# Patient Record
Sex: FEMALE | Race: WHITE | Employment: UNEMPLOYED | URBAN - METROPOLITAN AREA
[De-identification: names, ages, dates, MRNs, and addresses within clinical notes are randomized per-mention and may not be internally consistent; named-entity substitution may affect disease eponyms.]

---

## 2018-01-18 ENCOUNTER — APPOINTMENT (OUTPATIENT)
Dept: CT IMAGING | Age: 43
End: 2018-01-18
Attending: EMERGENCY MEDICINE
Payer: MEDICAID

## 2018-01-18 ENCOUNTER — HOSPITAL ENCOUNTER (OUTPATIENT)
Age: 43
Setting detail: OBSERVATION
Discharge: HOME | End: 2018-01-19
Attending: EMERGENCY MEDICINE | Admitting: INTERNAL MEDICINE
Payer: MEDICAID

## 2018-01-18 DIAGNOSIS — S00.03XA SCALP HEMATOMA, INITIAL ENCOUNTER: ICD-10-CM

## 2018-01-18 DIAGNOSIS — R56.9 NEW ONSET SEIZURE (HCC): Primary | ICD-10-CM

## 2018-01-18 DIAGNOSIS — D64.9 CHRONIC ANEMIA: ICD-10-CM

## 2018-01-18 LAB
ALBUMIN SERPL-MCNC: 3.8 G/DL (ref 3.4–5)
ALBUMIN/GLOB SERPL: 0.8 {RATIO} (ref 0.8–1.7)
ALP SERPL-CCNC: 100 U/L (ref 45–117)
ALT SERPL-CCNC: 17 U/L (ref 13–56)
AMPHET UR QL SCN: POSITIVE
ANION GAP SERPL CALC-SCNC: 13 MMOL/L (ref 3–18)
APPEARANCE UR: ABNORMAL
AST SERPL-CCNC: 24 U/L (ref 15–37)
BACTERIA URNS QL MICRO: ABNORMAL /HPF
BARBITURATES UR QL SCN: NEGATIVE
BASOPHILS # BLD: 0 K/UL (ref 0–0.06)
BASOPHILS NFR BLD: 1 % (ref 0–2)
BENZODIAZ UR QL: POSITIVE
BILIRUB SERPL-MCNC: 0.2 MG/DL (ref 0.2–1)
BILIRUB UR QL: NEGATIVE
BUN SERPL-MCNC: 8 MG/DL (ref 7–18)
BUN/CREAT SERPL: 11 (ref 12–20)
CALCIUM SERPL-MCNC: 8.7 MG/DL (ref 8.5–10.1)
CANNABINOIDS UR QL SCN: NEGATIVE
CAOX CRY URNS QL MICRO: ABNORMAL
CHLORIDE SERPL-SCNC: 99 MMOL/L (ref 100–108)
CK MB CFR SERPL CALC: 2.1 % (ref 0–4)
CK MB SERPL-MCNC: 1.6 NG/ML (ref 5–25)
CK SERPL-CCNC: 75 U/L (ref 26–192)
CO2 SERPL-SCNC: 24 MMOL/L (ref 21–32)
COCAINE UR QL SCN: NEGATIVE
COLOR UR: YELLOW
CREAT SERPL-MCNC: 0.76 MG/DL (ref 0.6–1.3)
DIFFERENTIAL METHOD BLD: ABNORMAL
EOSINOPHIL # BLD: 0 K/UL (ref 0–0.4)
EOSINOPHIL NFR BLD: 1 % (ref 0–5)
EPITH CASTS URNS QL MICRO: ABNORMAL /LPF (ref 0–5)
ERYTHROCYTE [DISTWIDTH] IN BLOOD BY AUTOMATED COUNT: 17.9 % (ref 11.6–14.5)
ETHANOL SERPL-MCNC: <3 MG/DL (ref 0–3)
GLOBULIN SER CALC-MCNC: 4.5 G/DL (ref 2–4)
GLUCOSE BLD STRIP.AUTO-MCNC: 107 MG/DL (ref 70–110)
GLUCOSE SERPL-MCNC: 117 MG/DL (ref 74–99)
GLUCOSE UR STRIP.AUTO-MCNC: NEGATIVE MG/DL
HCG SERPL QL: NEGATIVE
HCT VFR BLD AUTO: 28.9 % (ref 35–45)
HDSCOM,HDSCOM: ABNORMAL
HGB BLD-MCNC: 8.8 G/DL (ref 12–16)
HGB UR QL STRIP: NEGATIVE
KETONES UR QL STRIP.AUTO: NEGATIVE MG/DL
LEUKOCYTE ESTERASE UR QL STRIP.AUTO: ABNORMAL
LYMPHOCYTES # BLD: 1.7 K/UL (ref 0.9–3.6)
LYMPHOCYTES NFR BLD: 26 % (ref 21–52)
MCH RBC QN AUTO: 19.2 PG (ref 24–34)
MCHC RBC AUTO-ENTMCNC: 30.4 G/DL (ref 31–37)
MCV RBC AUTO: 63 FL (ref 74–97)
METHADONE UR QL: NEGATIVE
MONOCYTES # BLD: 0.3 K/UL (ref 0.05–1.2)
MONOCYTES NFR BLD: 5 % (ref 3–10)
NEUTS SEG # BLD: 4.6 K/UL (ref 1.8–8)
NEUTS SEG NFR BLD: 67 % (ref 40–73)
NITRITE UR QL STRIP.AUTO: NEGATIVE
OPIATES UR QL: NEGATIVE
PCP UR QL: NEGATIVE
PH UR STRIP: 6.5 [PH] (ref 5–8)
PHOSPHATE SERPL-MCNC: 2.3 MG/DL (ref 2.5–4.9)
PLATELET # BLD AUTO: 416 K/UL (ref 135–420)
POTASSIUM SERPL-SCNC: 4 MMOL/L (ref 3.5–5.5)
PROT SERPL-MCNC: 8.3 G/DL (ref 6.4–8.2)
PROT UR STRIP-MCNC: NEGATIVE MG/DL
RBC # BLD AUTO: 4.59 M/UL (ref 4.2–5.3)
RBC #/AREA URNS HPF: ABNORMAL /HPF (ref 0–5)
SODIUM SERPL-SCNC: 136 MMOL/L (ref 136–145)
SP GR UR REFRACTOMETRY: 1.01 (ref 1–1.03)
TROPONIN I SERPL-MCNC: 0.02 NG/ML (ref 0–0.04)
UROBILINOGEN UR QL STRIP.AUTO: 0.2 EU/DL (ref 0.2–1)
WBC # BLD AUTO: 6.7 K/UL (ref 4.6–13.2)
WBC URNS QL MICRO: ABNORMAL /HPF (ref 0–4)

## 2018-01-18 PROCEDURE — 74011250636 HC RX REV CODE- 250/636: Performed by: FAMILY MEDICINE

## 2018-01-18 PROCEDURE — 80053 COMPREHEN METABOLIC PANEL: CPT | Performed by: EMERGENCY MEDICINE

## 2018-01-18 PROCEDURE — 70450 CT HEAD/BRAIN W/O DYE: CPT

## 2018-01-18 PROCEDURE — 96368 THER/DIAG CONCURRENT INF: CPT

## 2018-01-18 PROCEDURE — 96361 HYDRATE IV INFUSION ADD-ON: CPT

## 2018-01-18 PROCEDURE — 80307 DRUG TEST PRSMV CHEM ANLYZR: CPT | Performed by: EMERGENCY MEDICINE

## 2018-01-18 PROCEDURE — 85025 COMPLETE CBC W/AUTO DIFF WBC: CPT | Performed by: EMERGENCY MEDICINE

## 2018-01-18 PROCEDURE — 99285 EMERGENCY DEPT VISIT HI MDM: CPT

## 2018-01-18 PROCEDURE — 74011000250 HC RX REV CODE- 250: Performed by: EMERGENCY MEDICINE

## 2018-01-18 PROCEDURE — 77030020263 HC SOL INJ SOD CL0.9% LFCR 1000ML

## 2018-01-18 PROCEDURE — 93005 ELECTROCARDIOGRAM TRACING: CPT

## 2018-01-18 PROCEDURE — 83540 ASSAY OF IRON: CPT | Performed by: FAMILY MEDICINE

## 2018-01-18 PROCEDURE — 82962 GLUCOSE BLOOD TEST: CPT

## 2018-01-18 PROCEDURE — 99218 HC RM OBSERVATION: CPT

## 2018-01-18 PROCEDURE — 74011250636 HC RX REV CODE- 250/636

## 2018-01-18 PROCEDURE — 82550 ASSAY OF CK (CPK): CPT | Performed by: EMERGENCY MEDICINE

## 2018-01-18 PROCEDURE — 84100 ASSAY OF PHOSPHORUS: CPT | Performed by: FAMILY MEDICINE

## 2018-01-18 PROCEDURE — 81001 URINALYSIS AUTO W/SCOPE: CPT | Performed by: EMERGENCY MEDICINE

## 2018-01-18 PROCEDURE — 82728 ASSAY OF FERRITIN: CPT | Performed by: FAMILY MEDICINE

## 2018-01-18 PROCEDURE — 96365 THER/PROPH/DIAG IV INF INIT: CPT

## 2018-01-18 PROCEDURE — 95816 EEG AWAKE AND DROWSY: CPT | Performed by: FAMILY MEDICINE

## 2018-01-18 PROCEDURE — 65270000029 HC RM PRIVATE

## 2018-01-18 PROCEDURE — 74011250636 HC RX REV CODE- 250/636: Performed by: EMERGENCY MEDICINE

## 2018-01-18 PROCEDURE — 84703 CHORIONIC GONADOTROPIN ASSAY: CPT | Performed by: EMERGENCY MEDICINE

## 2018-01-18 RX ORDER — LORAZEPAM 2 MG/ML
2 INJECTION INTRAMUSCULAR
Status: DISCONTINUED | OUTPATIENT
Start: 2018-01-18 | End: 2018-01-19 | Stop reason: HOSPADM

## 2018-01-18 RX ORDER — LEVETIRACETAM 15 MG/ML
1500 INJECTION INTRAVASCULAR
Status: DISPENSED | OUTPATIENT
Start: 2018-01-18 | End: 2018-01-19

## 2018-01-18 RX ORDER — SODIUM CHLORIDE 0.9 % (FLUSH) 0.9 %
5-10 SYRINGE (ML) INJECTION AS NEEDED
Status: DISCONTINUED | OUTPATIENT
Start: 2018-01-18 | End: 2018-01-19 | Stop reason: HOSPADM

## 2018-01-18 RX ORDER — LEVETIRACETAM 5 MG/ML
INJECTION INTRAVASCULAR
Status: COMPLETED
Start: 2018-01-18 | End: 2018-01-18

## 2018-01-18 RX ORDER — LIDOCAINE HYDROCHLORIDE 20 MG/ML
10 SOLUTION OROPHARYNGEAL
Status: COMPLETED | OUTPATIENT
Start: 2018-01-18 | End: 2018-01-18

## 2018-01-18 RX ORDER — SODIUM CHLORIDE 0.9 % (FLUSH) 0.9 %
5-10 SYRINGE (ML) INJECTION EVERY 8 HOURS
Status: DISCONTINUED | OUTPATIENT
Start: 2018-01-19 | End: 2018-01-19 | Stop reason: HOSPADM

## 2018-01-18 RX ORDER — SODIUM CHLORIDE 9 MG/ML
100 INJECTION, SOLUTION INTRAVENOUS CONTINUOUS
Status: DISCONTINUED | OUTPATIENT
Start: 2018-01-19 | End: 2018-01-19 | Stop reason: HOSPADM

## 2018-01-18 RX ORDER — ACETAMINOPHEN 325 MG/1
650 TABLET ORAL
Status: DISCONTINUED | OUTPATIENT
Start: 2018-01-18 | End: 2018-01-19 | Stop reason: HOSPADM

## 2018-01-18 RX ORDER — LEVETIRACETAM 10 MG/ML
INJECTION INTRAVASCULAR
Status: COMPLETED
Start: 2018-01-18 | End: 2018-01-18

## 2018-01-18 RX ADMIN — Medication 10 ML: at 23:50

## 2018-01-18 RX ADMIN — SODIUM CHLORIDE 100 ML/HR: 900 INJECTION, SOLUTION INTRAVENOUS at 23:51

## 2018-01-18 RX ADMIN — LEVETIRACETAM 1000 MG: 10 INJECTION INTRAVENOUS at 20:43

## 2018-01-18 RX ADMIN — LIDOCAINE HYDROCHLORIDE 10 ML: 20 SOLUTION ORAL; TOPICAL at 22:05

## 2018-01-18 RX ADMIN — LEVETIRACETAM 500 MG: 5 INJECTION INTRAVENOUS at 20:43

## 2018-01-18 RX ADMIN — SODIUM CHLORIDE 1000 ML: 900 INJECTION, SOLUTION INTRAVENOUS at 20:43

## 2018-01-18 NOTE — IP AVS SNAPSHOT
Sunita Pulse 
 
 
 [address] [place] [address] 
[phone] Patient: Griselda Castor MRN: [id number] QA:0/9/7592 About your hospitalization You were admitted on:  January 18, 2018 You last received care in the:  [address] NEURO [facility] You were discharged on:  January 19, 2018 Why you were hospitalized Your primary diagnosis was:  Seizure (Hcc) Your diagnoses also included:  Hematoma Of Frontal Scalp, Microcytic Anemia Follow-up Information Follow up With Details Comments Contact Info MD Duyen Diaz MD Schedule an appointment as soon as possible for a visit neurology [address] [place] [phone] Discharge Orders None A check renard indicates which time of day the medication should be taken. My Medications CONTINUE taking these medications Instructions Each Dose to Equal  
 Morning Noon Evening Bedtime ADDERALL 30 mg tablet Generic drug:  dextroamphetamine-amphetamine Your last dose was: Your next dose is: Take 30 mg by mouth two (2) times a day. Indications: ATTENTION-DEFICIT HYPERACTIVITY DISORDER  
 30 mg  
    
   
   
   
  
 DAILY MULTI-VITAMINS/IRON tablet Generic drug:  multivitamin with iron Your last dose was: Your next dose is: Take 1 Tab by mouth daily. 1 Tab  
    
   
   
   
  
 docusate sodium 100 mg capsule Commonly known as:  Olene Cam Your last dose was: Your next dose is: Take 1 Cap by mouth two (2) times a day. Indications: Stool Softner 100 mg  
    
   
   
   
  
 omeprazole 20 mg capsule Commonly known as:  PriLOSEC Your last dose was: Your next dose is: Take 1 Cap by mouth daily. 20 mg  
    
   
   
   
  
 ondansetron 4 mg disintegrating tablet Commonly known as:  ZOFRAN ODT  
   
 Your last dose was: Your next dose is: Take 1 Tab by mouth every six (6) hours as needed for Nausea. 4 mg  
    
   
   
   
  
 polyethylene glycol 17 gram/dose powder Commonly known as:  Sarah Anatoliy Your last dose was: Your next dose is: Take 17 g by mouth daily. 1 tablespoon with 8 oz of water daily  Indications: CONSTIPATION  
 17 g Discharge Instructions STOP TAKING XANAX 
  
 
DISCHARGE SUMMARY from Nurse PATIENT INSTRUCTIONS: 
 
 
F-face looks uneven A-arms unable to move or move unevenly S-speech slurred or non-existent T-time-call 911 as soon as signs and symptoms begin-DO NOT go Back to bed or wait to see if you get better-TIME IS BRAIN. Warning Signs of HEART ATTACK Call 911 if you have these symptoms: 
? Chest discomfort. Most heart attacks involve discomfort in the center of the chest that lasts more than a few minutes, or that goes away and comes back. It can feel like uncomfortable pressure, squeezing, fullness, or pain. ? Discomfort in other areas of the upper body. Symptoms can include pain or discomfort in one or both arms, the back, neck, jaw, or stomach. ? Shortness of breath with or without chest discomfort. ? Other signs may include breaking out in a cold sweat, nausea, or lightheadedness. Don't wait more than five minutes to call [address]! Fast action can save your life. Calling 911 is almost always the fastest way to get lifesaving treatment.  Emergency Medical Services staff can begin treatment when they arrive  up to an hour sooner than if someone gets to the hospital by car. The discharge information has been reviewed with the patient. The patient verbalized understanding. Discharge medications reviewed with the patient and appropriate educational materials and side effects teaching were provided. ___________________________________________________________________________________________________________________________________ Head Injury: Care Instructions Your Care Instructions Most injuries to the head are minor. Bumps, cuts, and scrapes on the head and face usually heal well and can be treated the same as injuries to other parts of the body. Although it's rare, once in a while a more serious problem shows up after you are home. So it's good to be on the lookout for symptoms for a day or two. Follow-up care is a key part of your treatment and safety. Be sure to make and go to all appointments, and call your doctor if you are having problems. It's also a good idea to know your test results and keep a list of the medicines you take. How can you care for yourself at home? · Follow your doctor's instructions. He or she will tell you if you need someone to watch you closely for the next 24 hours or longer. · Take it easy for the next few days or more if you are not feeling well. · Ask your doctor when it's okay for you to go back to activities like driving a car, riding a bike, or operating machinery. When should you call for help? Call 911 anytime you think you may need emergency care. For example, call if: 
? · You have a seizure. ? · You passed out (lost consciousness). ? · You are confused or can't stay awake. ?Call your doctor now or seek immediate medical care if: 
? · You have new or worse vomiting. ? · You feel less alert. ? · You have new weakness or numbness in any part of your body. ? Watch closely for changes in your health, and be sure to contact your doctor if: 
? · You do not get better as expected. ? · You have new symptoms, such as headaches, trouble concentrating, or changes in mood. Where can you learn more? Go to http://kiara-val.info/. Enter Q304 in the search box to learn more about \"Head Injury: Care Instructions. \" Current as of: October 14, 2016 Content Version: 11.4 © 0003-8188 Collactive. Care instructions adapted under license by Gigantt (which disclaims liability or warranty for this information). If you have questions about a medical condition or this instruction, always ask your healthcare professional. [place] [address] any warranty or liability for your use of this information. Seizure: Care Instructions Your Care Instructions Seizures are caused by abnormal patterns of electrical signals in the brain. They are different for each person. Seizures can affect movement, speech, vision, or awareness. Some people have only slight shaking of a hand and do not pass out. Other people may pass out and have violent shaking of the whole body. Some people appear to stare into space. They are awake, but they can't respond normally. Later, they may not remember what happened. You may need tests to identify the type and cause of the seizures. A seizure may occur only once, or you may have them more than one time. Taking medicines as directed and following up with your doctor may help keep you from having more seizures. The doctor has checked you carefully, but problems can develop later. If you notice any problems or new symptoms, get medical treatment right away. Follow-up care is a key part of your treatment and safety. Be sure to make and go to all appointments, and call your doctor if you are having problems. It's also a good idea to know your test results and keep a list of the medicines you take. How can you care for yourself at home? · Be safe with medicines. Take your medicines exactly as prescribed. Call your doctor if you think you are having a problem with your medicine. · Do not do any activity that could be dangerous to you or others until your doctor says it is safe to do so. For example, do not drive a car, operate machinery, swim, or climb ladders. · Be sure that anyone treating you for any health problem knows that you have had a seizure and what medicines you are taking for it. · Identify and avoid things that may make you more likely to have a seizure. These may include lack of sleep, alcohol or drug use, stress, or not eating. · Make sure you go to your follow-up appointment. When should you call for help? Call 911 anytime you think you may need emergency care. For example, call if: 
? · You have another seizure. ? · You have more than one seizure in 24 hours. ? · You have new symptoms, such as trouble walking, speaking, or thinking clearly. ?Call your doctor now or seek immediate medical care if: 
? · You are not acting normally. ? Watch closely for changes in your health, and be sure to contact your doctor if you have any problems. Where can you learn more? Go to http://kiara-val.info/. Enter D332 in the search box to learn more about \"Seizure: Care Instructions. \" Current as of: October 14, 2016 Content Version: 11.4 © 7928-9805 BioAtlantis. Care instructions adapted under license by Sonian (which disclaims liability or warranty for this information). If you have questions about a medical condition or this instruction, always ask your healthcare professional. [place] [address] any warranty or liability for your use of this information. Anemia: Care Instructions Your Care Instructions Anemia is a low level of red blood cells, which carry oxygen throughout your body. Many things can cause anemia.  Lack of iron is one of the most common causes. Your body needs iron to make hemoglobin, a substance in red blood cells that carries oxygen from the lungs to your body's cells. Without enough iron, the body produces fewer and smaller red blood cells. As a result, your body's cells do not get enough oxygen, and you feel tired and weak. And you may have trouble concentrating. Bleeding is the most common cause of a lack of iron. You may have heavy menstrual bleeding or bleeding caused by conditions such as ulcers, hemorrhoids, or cancer. Regular use of aspirin or other anti-inflammatory medicines (such as ibuprofen) also can cause bleeding in some people. A lack of iron in your diet also can cause anemia, especially at times when the body needs more iron, such as during pregnancy, infancy, and the teen years. Your doctor may have prescribed iron pills. It may take several months of treatment for your iron levels to return to normal. Your doctor also may suggest that you eat foods that are rich in iron, such as meat and beans. There are many other causes of anemia. It is not always due to a lack of iron. Finding the specific cause of your anemia will help your doctor find the right treatment for you. Follow-up care is a key part of your treatment and safety. Be sure to make and go to all appointments, and call your doctor if you are having problems. It's also a good idea to know your test results and keep a list of the medicines you take. How can you care for yourself at home? · Take your medicines exactly as prescribed. Call your doctor if you think you are having a problem with your medicine. · If your doctor recommends iron pills, take them as directed: ¨ Try to take the pills on an empty stomach about 1 hour before or 2 hours after meals. But you may need to take iron with food to avoid an upset stomach.  
¨ Do not take antacids or drink milk or caffeine drinks (such as coffee, tea, or cola) at the same time or within 2 hours of the time that you take your iron. They can make it hard for your body to absorb the iron. ¨ Vitamin C (from food or supplements) helps your body absorb iron. Try taking iron pills with a glass of orange juice or some other food that is high in vitamin C, such as citrus fruits. ¨ Iron pills may cause stomach problems, such as heartburn, nausea, diarrhea, constipation, and cramps. Be sure to drink plenty of fluids, and include fruits, vegetables, and fiber in your diet each day. Iron pills often make your bowel movements dark or green. ¨ If you forget to take an iron pill, do not take a double dose of iron the next time you take a pill. ¨ Keep iron pills out of the reach of small children. An overdose of iron can be very dangerous. · Follow your doctor's advice about eating iron-rich foods. These include red meat, shellfish, poultry, eggs, beans, raisins, whole-grain bread, and leafy green vegetables. · Steam vegetables to help them keep their iron content. When should you call for help? Call 911 anytime you think you may need emergency care. For example, call if: 
? · You have symptoms of a heart attack. These may include: ¨ Chest pain or pressure, or a strange feeling in the chest. 
¨ Sweating. ¨ Shortness of breath. ¨ Nausea or vomiting. ¨ Pain, pressure, or a strange feeling in the back, neck, jaw, or upper belly or in one or both shoulders or arms. ¨ Lightheadedness or sudden weakness. ¨ A fast or irregular heartbeat. After you call 911, the  may tell you to chew 1 adult-strength or 2 to 4 low-dose aspirin. Wait for an ambulance. Do not try to drive yourself. ? · You passed out (lost consciousness). ?Call your doctor now or seek immediate medical care if: 
? · You have new or increased shortness of breath. ? · You are dizzy or lightheaded, or you feel like you may faint. ? · Your fatigue and weakness continue or get worse. ? · You have any abnormal bleeding, such as: 
¨ Nosebleeds. ¨ Vaginal bleeding that is different (heavier, more frequent, at a different time of the month) than what you are used to. ¨ Bloody or black stools, or rectal bleeding. ¨ Bloody or pink urine. ? Watch closely for changes in your health, and be sure to contact your doctor if: 
? · You do not get better as expected. Where can you learn more? Go to http://kiara-val.info/. Enter R301 in the search box to learn more about \"Anemia: Care Instructions. \" Current as of: October 13, 2016 Content Version: 11.4 © 1158-9511 Proteus Industries. Care instructions adapted under license by Railpod (which disclaims liability or warranty for this information). If you have questions about a medical condition or this instruction, always ask your healthcare professional. [place] [address] any warranty or liability for your use of this information. Hematoma: Care Instructions Your Care Instructions A hematoma is a bad bruise. It happens when an injury causes blood to collect and pool under the skin. The pooling blood gives the skin a spongy, rubbery, lumpy feel. A hematoma usually is not a cause for concern. It is not the same thing as a blood clot in a vein, and it does not cause blood clots. Follow-up care is a key part of your treatment and safety. Be sure to make and go to all appointments, and call your doctor if you are having problems. It's also a good idea to know your test results and keep a list of the medicines you take. How can you care for yourself at home? · Rest and protect the bruised area. · Put ice or a cold pack on the area for 10 to 20 minutes at a time. · Prop up the bruised area on a pillow when you ice it or anytime you sit or lie down during the next 3 days. Try to keep it above the level of your heart. This will help reduce swelling. · Wrapping the bruised area with an elastic bandage such as an Ace wrap will help decrease swelling. Don't wrap it too tightly, as this can cause more swelling below the affected area. · Take an over-the-counter pain medicine, such as acetaminophen (Tylenol), ibuprofen (Advil, Motrin), or naproxen (Aleve). · Do not take two or more pain medicines at the same time unless the doctor told you to. Many pain medicines have acetaminophen, which is Tylenol. Too much acetaminophen (Tylenol) can be harmful. When should you call for help? Call your doctor now or seek immediate medical care if: 
? · You have signs of skin infection, such as: 
¨ Increased pain, swelling, warmth, or redness. ¨ Red streaks leading from the area. ¨ Pus draining from the area. ¨ A fever. ? Watch closely for changes in your health, and be sure to contact your doctor if: 
? · The bruise lasts longer than 4 weeks. ? · The bruise gets bigger or becomes more painful. ? · You do not get better as expected. Where can you learn more? Go to http://kiara-val.info/. Enter P911 in the search box to learn more about \"Hematoma: Care Instructions. \" Current as of: March 20, 2017 Content Version: 11.4 © 3762-7829 Tabtor. Care instructions adapted under license by Woven Systems (which disclaims liability or warranty for this information). If you have questions about a medical condition or this instruction, always ask your healthcare professional. [place] [address] any warranty or liability for your use of this information. Patient armband removed and shredded. Standard [employer] Index Announcement We are excited to announce that we are making your provider's discharge notes available to you in Workdayhart.   You will see these notes when they are completed and signed by the physician that discharged you from your recent hospital stay. If you have any questions or concerns about any information you see in VisibleGains, please call the Health Information Department where you were seen or reach out to your Primary Care Provider for more information about your plan of care. Introducing hospitals & HEALTH SERVICES! [facility] introduces VisibleGains patient portal. Now you can access parts of your medical record, email your doctor's office, and request medication refills online. 1. In your internet browser, go to [email or web address] 2. Click on the First Time User? Click Here link in the Sign In box. You will see the New Member Sign Up page. 3. Enter your VisibleGains Access Code exactly as it appears below. You will not need to use this code after youve completed the sign-up process. If you do not sign up before the expiration date, you must request a new code. · VisibleGains Access Code: [email or web address] Expires: 4/19/2018  7:54 PM 
 
4. Enter the last four digits of your Social Security Number (xxxx) and Date of Birth (mm/dd/yyyy) as indicated and click Submit. You will be taken to the next sign-up page. 5. Create a VisibleGains ID. This will be your VisibleGains login ID and cannot be changed, so think of one that is secure and easy to remember. 6. Create a VisibleGains password. You can change your password at any time. 7. Enter your Password Reset Question and Answer. This can be used at a later time if you forget your password. 8. Enter your e-mail address. You will receive e-mail notification when new information is available in [address] 9. Click Sign Up. You can now view and download portions of your medical record. 10. Click the Download Summary menu link to download a portable copy of your medical information. If you have questions, please visit the Frequently Asked Questions section of the VisibleGains website. Remember, VisibleGains is NOT to be used for urgent needs. For medical emergencies, dial 911. Now available from your iPhone and Android! Providers Seen During Your Hospitalization Provider Specialty Primary office phone Lucia Horne MD Emergency Medicine [phone] Bard Jose MD Family Practice [phone] Stefan Gordones, [address] [place] [address] Internal Medicine [phone] Your Primary Care Physician (PCP) Primary Care Physician Office Phone Office Fax MINDY VEGAS ** None ** ** None ** You are allergic to the following No active allergies Recent Documentation Height Weight Breastfeeding? BMI Smoking Status 1.676 m 84.2 kg No 29.97 kg/m2 Never Smoker Emergency Contacts Name Discharge Info Relation Home Work Mobile [address] [facility] CAREGIVER [3] Spouse [3] 22 [phone] Patient Belongings The following personal items are in your possession at time of discharge: 
  Dental Appliances: None  Visual Aid: None      Home Medications: None   Jewelry: None  Clothing: Bathrobe, Footwear, Pants, Shirt, Sweater, Undergarments    Other Valuables: Cell Phone  Personal Items Sent to Safe:  (none) Please provide this summary of care documentation to your next provider. Signatures-by signing, you are acknowledging that this After Visit Summary has been reviewed with you and you have received a copy. Patient Signature:  ____________________________________________________________ Date:  ____________________________________________________________  
  
[facility] Provider Signature:  ____________________________________________________________ Date:  ____________________________________________________________

## 2018-01-18 NOTE — IP AVS SNAPSHOT
[address] [place] [address] 
[phone] Patient: Laury Dockery MRN: [id number] AAF:8/0/1825 A check renard indicates which time of day the medication should be taken. My Medications CONTINUE taking these medications Instructions Each Dose to Equal  
 Morning Noon Evening Bedtime ADDERALL 30 mg tablet Generic drug:  dextroamphetamine-amphetamine Your last dose was: Your next dose is: Take 30 mg by mouth two (2) times a day. Indications: ATTENTION-DEFICIT HYPERACTIVITY DISORDER  
 30 mg  
    
   
   
   
  
 DAILY MULTI-VITAMINS/IRON tablet Generic drug:  multivitamin with iron Your last dose was: Your next dose is: Take 1 Tab by mouth daily. 1 Tab  
    
   
   
   
  
 docusate sodium 100 mg capsule Commonly known as:  Thedore Dial Your last dose was: Your next dose is: Take 1 Cap by mouth two (2) times a day. Indications: Stool Softner 100 mg  
    
   
   
   
  
 omeprazole 20 mg capsule Commonly known as:  PriLOSEC Your last dose was: Your next dose is: Take 1 Cap by mouth daily. 20 mg  
    
   
   
   
  
 ondansetron 4 mg disintegrating tablet Commonly known as:  ZOFRAN ODT Your last dose was: Your next dose is: Take 1 Tab by mouth every six (6) hours as needed for Nausea. 4 mg  
    
   
   
   
  
 polyethylene glycol 17 gram/dose powder Commonly known as:  Nika Mayes Your last dose was: Your next dose is: Take 17 g by mouth daily. 1 tablespoon with 8 oz of water daily  Indications: CONSTIPATION  
 17 g

## 2018-01-18 NOTE — Clinical Note
Status[de-identified] Inpatient [101] Type of Bed: Remote Telemetry [29] Inpatient Hospitalization Certified Necessary for the Following Reasons: 3. Patient receiving treatment that can only be provided in an inpatient setting (further clarification in H&P documentation) Admitting Diagnosis: Seizure ([facility] [address] [[id number]] Admitting Physician: Memo Burk Attending Physician: Memo Burk Estimated Length of Stay: 2 Midnights Discharge Plan[de-identified] Home with Office Follow-up

## 2018-01-19 ENCOUNTER — APPOINTMENT (OUTPATIENT)
Dept: MRI IMAGING | Age: 43
End: 2018-01-19
Attending: NURSE PRACTITIONER
Payer: MEDICAID

## 2018-01-19 VITALS
OXYGEN SATURATION: 99 % | DIASTOLIC BLOOD PRESSURE: 92 MMHG | HEIGHT: 66 IN | RESPIRATION RATE: 18 BRPM | TEMPERATURE: 98 F | HEART RATE: 71 BPM | BODY MASS INDEX: 29.84 KG/M2 | SYSTOLIC BLOOD PRESSURE: 157 MMHG | WEIGHT: 185.7 LBS

## 2018-01-19 LAB
ALBUMIN SERPL-MCNC: 3 G/DL (ref 3.4–5)
ALBUMIN/GLOB SERPL: 0.9 {RATIO} (ref 0.8–1.7)
ALP SERPL-CCNC: 76 U/L (ref 45–117)
ALT SERPL-CCNC: 11 U/L (ref 13–56)
ANION GAP SERPL CALC-SCNC: 9 MMOL/L (ref 3–18)
AST SERPL-CCNC: 10 U/L (ref 15–37)
ATRIAL RATE: 110 BPM
BASOPHILS # BLD: 0 K/UL (ref 0–0.06)
BASOPHILS NFR BLD: 0 % (ref 0–2)
BILIRUB SERPL-MCNC: 0.2 MG/DL (ref 0.2–1)
BUN SERPL-MCNC: 7 MG/DL (ref 7–18)
BUN/CREAT SERPL: 12 (ref 12–20)
CALCIUM SERPL-MCNC: 7.9 MG/DL (ref 8.5–10.1)
CALCULATED P AXIS, ECG09: 69 DEGREES
CALCULATED R AXIS, ECG10: -34 DEGREES
CALCULATED T AXIS, ECG11: 29 DEGREES
CHLORIDE SERPL-SCNC: 105 MMOL/L (ref 100–108)
CO2 SERPL-SCNC: 25 MMOL/L (ref 21–32)
CREAT SERPL-MCNC: 0.57 MG/DL (ref 0.6–1.3)
DIAGNOSIS, 93000: NORMAL
DIFFERENTIAL METHOD BLD: ABNORMAL
EOSINOPHIL # BLD: 0 K/UL (ref 0–0.4)
EOSINOPHIL NFR BLD: 1 % (ref 0–5)
ERYTHROCYTE [DISTWIDTH] IN BLOOD BY AUTOMATED COUNT: 17.7 % (ref 11.6–14.5)
ERYTHROCYTE [DISTWIDTH] IN BLOOD BY AUTOMATED COUNT: 17.8 % (ref 11.6–14.5)
FERRITIN SERPL-MCNC: 3 NG/ML (ref 8–388)
GLOBULIN SER CALC-MCNC: 3.3 G/DL (ref 2–4)
GLUCOSE BLD STRIP.AUTO-MCNC: 91 MG/DL (ref 70–110)
GLUCOSE SERPL-MCNC: 101 MG/DL (ref 74–99)
HCT VFR BLD AUTO: 22.6 % (ref 35–45)
HCT VFR BLD AUTO: 24.1 % (ref 35–45)
HGB BLD-MCNC: 6.8 G/DL (ref 12–16)
HGB BLD-MCNC: 7.3 G/DL (ref 12–16)
IRON SATN MFR SERPL: 4 %
IRON SERPL-MCNC: 20 UG/DL (ref 50–175)
LYMPHOCYTES # BLD: 1.7 K/UL (ref 0.9–3.6)
LYMPHOCYTES NFR BLD: 32 % (ref 21–52)
MCH RBC QN AUTO: 19 PG (ref 24–34)
MCH RBC QN AUTO: 19 PG (ref 24–34)
MCHC RBC AUTO-ENTMCNC: 30.1 G/DL (ref 31–37)
MCHC RBC AUTO-ENTMCNC: 30.3 G/DL (ref 31–37)
MCV RBC AUTO: 62.8 FL (ref 74–97)
MCV RBC AUTO: 63.3 FL (ref 74–97)
MONOCYTES # BLD: 0.4 K/UL (ref 0.05–1.2)
MONOCYTES NFR BLD: 8 % (ref 3–10)
NEUTS SEG # BLD: 3.1 K/UL (ref 1.8–8)
NEUTS SEG NFR BLD: 59 % (ref 40–73)
P-R INTERVAL, ECG05: 172 MS
PLATELET # BLD AUTO: 389 K/UL (ref 135–420)
PLATELET # BLD AUTO: 425 K/UL (ref 135–420)
PMV BLD AUTO: 9.2 FL (ref 9.2–11.8)
PMV BLD AUTO: 9.4 FL (ref 9.2–11.8)
POTASSIUM SERPL-SCNC: 3.7 MMOL/L (ref 3.5–5.5)
PROT SERPL-MCNC: 6.3 G/DL (ref 6.4–8.2)
Q-T INTERVAL, ECG07: 362 MS
QRS DURATION, ECG06: 142 MS
QTC CALCULATION (BEZET), ECG08: 489 MS
RBC # BLD AUTO: 3.57 M/UL (ref 4.2–5.3)
RBC # BLD AUTO: 3.84 M/UL (ref 4.2–5.3)
SODIUM SERPL-SCNC: 139 MMOL/L (ref 136–145)
TIBC SERPL-MCNC: 536 UG/DL (ref 250–450)
VENTRICULAR RATE, ECG03: 110 BPM
WBC # BLD AUTO: 4 K/UL (ref 4.6–13.2)
WBC # BLD AUTO: 5.3 K/UL (ref 4.6–13.2)

## 2018-01-19 PROCEDURE — 74011250636 HC RX REV CODE- 250/636: Performed by: FAMILY MEDICINE

## 2018-01-19 PROCEDURE — A9577 INJ MULTIHANCE: HCPCS | Performed by: INTERNAL MEDICINE

## 2018-01-19 PROCEDURE — 77030032490 HC SLV COMPR SCD KNE COVD -B

## 2018-01-19 PROCEDURE — 82962 GLUCOSE BLOOD TEST: CPT

## 2018-01-19 PROCEDURE — 74011250637 HC RX REV CODE- 250/637: Performed by: INTERNAL MEDICINE

## 2018-01-19 PROCEDURE — 99218 HC RM OBSERVATION: CPT

## 2018-01-19 PROCEDURE — 80053 COMPREHEN METABOLIC PANEL: CPT | Performed by: FAMILY MEDICINE

## 2018-01-19 PROCEDURE — 96375 TX/PRO/DX INJ NEW DRUG ADDON: CPT

## 2018-01-19 PROCEDURE — 77030020263 HC SOL INJ SOD CL0.9% LFCR 1000ML

## 2018-01-19 PROCEDURE — 85027 COMPLETE CBC AUTOMATED: CPT | Performed by: INTERNAL MEDICINE

## 2018-01-19 PROCEDURE — 85025 COMPLETE CBC W/AUTO DIFF WBC: CPT | Performed by: FAMILY MEDICINE

## 2018-01-19 PROCEDURE — 70553 MRI BRAIN STEM W/O & W/DYE: CPT

## 2018-01-19 PROCEDURE — 74011250636 HC RX REV CODE- 250/636: Performed by: INTERNAL MEDICINE

## 2018-01-19 PROCEDURE — 36415 COLL VENOUS BLD VENIPUNCTURE: CPT | Performed by: FAMILY MEDICINE

## 2018-01-19 PROCEDURE — 74011250637 HC RX REV CODE- 250/637: Performed by: FAMILY MEDICINE

## 2018-01-19 RX ORDER — IBUPROFEN 400 MG/1
400 TABLET ORAL ONCE
Status: COMPLETED | OUTPATIENT
Start: 2018-01-19 | End: 2018-01-19

## 2018-01-19 RX ORDER — HYDROCODONE BITARTRATE AND ACETAMINOPHEN 5; 325 MG/1; MG/1
1 TABLET ORAL
Status: DISCONTINUED | OUTPATIENT
Start: 2018-01-19 | End: 2018-01-19 | Stop reason: HOSPADM

## 2018-01-19 RX ADMIN — IBUPROFEN 400 MG: 400 TABLET, FILM COATED ORAL at 05:06

## 2018-01-19 RX ADMIN — HYDROCODONE BITARTRATE AND ACETAMINOPHEN 1 TABLET: 5; 325 TABLET ORAL at 15:03

## 2018-01-19 RX ADMIN — Medication 10 ML: at 09:41

## 2018-01-19 RX ADMIN — ACETAMINOPHEN 650 MG: 325 TABLET, FILM COATED ORAL at 00:42

## 2018-01-19 RX ADMIN — GADOBENATE DIMEGLUMINE 15 ML: 529 INJECTION, SOLUTION INTRAVENOUS at 18:15

## 2018-01-19 RX ADMIN — LORAZEPAM 2 MG: 2 INJECTION, SOLUTION INTRAMUSCULAR; INTRAVENOUS at 17:34

## 2018-01-19 RX ADMIN — HYDROCODONE BITARTRATE AND ACETAMINOPHEN 1 TABLET: 5; 325 TABLET ORAL at 09:42

## 2018-01-19 RX ADMIN — Medication 10 ML: at 15:05

## 2018-01-19 NOTE — ED PROVIDER NOTES
HPI Comments: Rin Blankenship is a 40 y.o. Female who noted by  to have a possible seizure tonight. Pt was sitting at computer, screamed and was noted to have shaking, fell over hit head. Activity lasted about 1.5 min and was noted to be confused after. Also urinary incontinence and tongue injury. No prior h/o seizures. Pt denies feeling ill prior to event and does not recall event. Denies recent fcs, cough, cp, sob abd pain. No current numbness tingling, weakness. Denies any alcohol, drug use. Sx now resolved. Nothing given for treatment in route    The history is provided by the patient. Past Medical History:   Diagnosis Date    Adult ADHD     Chronic narcotic dependence ([facility] [address] 2016    See [place] Prescription Monitoring Program Report, See note for this visit.  CTS (carpal tunnel syndrome) 2016    Kari Pinedo    Fatty infiltration of liver 10/11/2007    Noted on RUQ U/S.     Gastric ulcer 2013    GI Dr. Odilia Granado: Noted on EGD.  H/O: upper GI bleed 2013    GI Dr. Odilia Granado: EGD Revealed: Upper GI Bleed w/ ulcer noted BELOW G-J Anastomosis treated with 2 Endoclips.  Helicobacter pylori ab+ 10/11/2007    Hx MRSA infection     LLE Calf Abscess MRSA +     Hyperlipidemia     Knee pain 2016    Kari Cross    Low back pain     Melena     Microcytic anemia     Odontalgia 2016    OMFS Dr. Essie Mendosa 2016    Dr. Rafael Serra DDS    Odontalgia 2016    Dr. Rafael Serra DDS    Recurrent UTI     Vitamin D insufficiency 2009       Past Surgical History:   Procedure Laterality Date    HX CARPAL TUNNEL RELEASE Right 2016    Dr. Alyse Pinedo      HX  SECTION      HX CYST INCISION AND DRAINAGE      LLE Calf Abscess + MRSA    HX ENDOSCOPY 02/26/2013    GI Dr. Rivas Geiger: EGD Revealed: Upper GI Bleed w/ ulcer noted BELOW G-J Anastomosis treated with 2 Endoclips.  HX GASTRIC BYPASS  04/03/2008    Dr. Theresa Lawson         History reviewed. No pertinent family history. Social History     Social History    Marital status:      Spouse name: N/A    Number of children: N/A    Years of education: N/A     Occupational History    Not on file. Social History Main Topics    Smoking status: Never Smoker    Smokeless tobacco: Never Used    Alcohol use Yes      Comment: occ    Drug use: No    Sexual activity: Not on file     Other Topics Concern    Not on file     Social History Narrative         ALLERGIES: Review of patient's allergies indicates no known allergies. Review of Systems   Constitutional: Negative for fever. HENT: Negative for sore throat and trouble swallowing. Eyes: Negative for visual disturbance. Respiratory: Negative for shortness of breath. Cardiovascular: Negative for chest pain. Gastrointestinal: Negative for abdominal pain, blood in stool and constipation. Endocrine: Negative for polyuria. Genitourinary: Negative for difficulty urinating. Musculoskeletal: Negative for gait problem. Skin: Positive for wound (scalp bruise). Allergic/Immunologic: Negative for immunocompromised state. Neurological: Positive for seizures. Hematological: Does not bruise/bleed easily. Psychiatric/Behavioral: Positive for confusion (now improved). Vitals:    01/18/18 2245 01/18/18 2300 01/18/18 2320 01/19/18 0100   BP: (!) 162/94 (!) 162/95 (!) 157/96    Pulse: 89 94 91    Resp: 13 17 18    Temp:   97.9 °F (36.6 °C)    SpO2: 100% 100% 100%    Weight:    84.8 kg (187 lb)   Height:    5' 6\" (1.676 m)            Physical Exam   Constitutional: She is oriented to person, place, and time. She appears well-developed and well-nourished. No distress. HENT:   Head: Normocephalic.    Right Ear: External ear normal.   Left Ear: External ear normal.   Nose: Nose normal.   Mouth/Throat: Uvula is midline, oropharynx is clear and moist and mucous membranes are normal.   Left forehead contusion  Right sided tongue bruise     Eyes: Conjunctivae and EOM are normal. Pupils are equal, round, and reactive to light. No scleral icterus. Neck: Normal range of motion. Neck supple. Cardiovascular: Normal rate, regular rhythm, normal heart sounds and intact distal pulses. Pulmonary/Chest: Effort normal and breath sounds normal.   Abdominal: Soft. There is no tenderness. Musculoskeletal: She exhibits no edema. Neurological: She is alert and oriented to person, place, and time. She displays no tremor. No cranial nerve deficit (3-12) or sensory deficit (gross touch intact). She exhibits normal muscle tone. She displays no seizure activity. Coordination and gait normal. GCS eye subscore is 4. GCS verbal subscore is 5. GCS motor subscore is 6. Skin: Skin is warm and dry. She is not diaphoretic. Psychiatric: Her behavior is normal.   Nursing note and vitals reviewed.        [facility]  ED Course       Procedures    Vitals:  Patient Vitals for the past 12 hrs:   Temp Pulse Resp BP SpO2   01/18/18 2320 97.9 °F (36.6 °C) 91 18 (!) 157/96 100 %   01/18/18 2300 - 94 17 (!) 162/95 100 %   01/18/18 2245 - 89 13 (!) 162/94 100 %   01/18/18 2230 - 89 16 (!) 165/95 99 %   01/18/18 2215 - 92 18 (!) 170/99 100 %   01/18/18 2200 - 95 17 (!) 168/99 100 %   01/18/18 2145 - 90 16 (!) 155/91 100 %   01/18/18 2130 - 93 15 (!) 157/92 100 %   01/18/18 2040 - (!) 103 - (!) 164/95 100 %   01/18/18 2023 98.3 °F (36.8 °C) (!) 112 16 (!) 165/91 100 %         Medications ordered:   Medications   levETIRAcetam (KEPPRA) 1500 mg in saline (iso-osm) 100 ml IVPB (0 mg IntraVENous Held 1/18/18 2100)   sodium chloride (NS) flush 5-10 mL (10 mL IntraVENous Given 1/18/18 8372)   sodium chloride (NS) flush 5-10 mL (not administered)   0.9% sodium chloride infusion (100 mL/hr IntraVENous New Bag 1/18/18 2351)   LORazepam (ATIVAN) injection 2 mg (not administered)   acetaminophen (TYLENOL) tablet 650 mg (650 mg Oral Given 1/19/18 0042)   sodium chloride 0.9 % bolus infusion 1,000 mL (1,000 mL IntraVENous New Bag 1/18/18 2043)   levETIRAcetam in NaCl (iso-os) (KEPPRA) 1,000 mg/100 mL IVPB premix pgbk (  IV Completed 1/18/18 2126)   levETIRAcetam in NaCl (iso-os) (KEPPRA) 500 mg/100 mL IVPB premix pgbk (  IV Completed 1/18/18 2126)   lidocaine (XYLOCAINE) 2 % viscous solution 10 mL (10 mL Mouth/Throat Given 1/18/18 2205)         Lab findings:  Recent Results (from the past 12 hour(s))   CBC WITH AUTOMATED DIFF    Collection Time: 01/18/18  8:25 PM   Result Value Ref Range    WBC 6.7 4.6 - 13.2 K/uL    RBC 4.59 4.20 - 5.30 M/uL    HGB 8.8 (L) 12.0 - 16.0 g/dL    HCT 28.9 (L) 35.0 - 45.0 %    MCV 63.0 (L) 74.0 - 97.0 FL    MCH 19.2 (L) 24.0 - 34.0 PG    MCHC 30.4 (L) 31.0 - 37.0 g/dL    RDW 17.9 (H) 11.6 - 14.5 %    PLATELET 415 208 - 361 K/uL    NEUTROPHILS 67 40 - 73 %    LYMPHOCYTES 26 21 - 52 %    MONOCYTES 5 3 - 10 %    EOSINOPHILS 1 0 - 5 %    BASOPHILS 1 0 - 2 %    ABS. NEUTROPHILS 4.6 1.8 - 8.0 K/UL    ABS. LYMPHOCYTES 1.7 0.9 - 3.6 K/UL    ABS. MONOCYTES 0.3 0.05 - 1.2 K/UL    ABS. EOSINOPHILS 0.0 0.0 - 0.4 K/UL    ABS. BASOPHILS 0.0 0.0 - 0.06 K/UL    DF AUTOMATED     METABOLIC PANEL, COMPREHENSIVE    Collection Time: 01/18/18  8:25 PM   Result Value Ref Range    Sodium 136 136 - 145 mmol/L    Potassium 4.0 3.5 - 5.5 mmol/L    Chloride 99 (L) 100 - 108 mmol/L    CO2 24 21 - 32 mmol/L    Anion gap 13 3.0 - 18 mmol/L    Glucose 117 (H) 74 - 99 mg/dL    BUN 8 7.0 - 18 MG/DL    Creatinine 0.76 0.6 - 1.3 MG/DL    BUN/Creatinine ratio 11 (L) 12 - 20      GFR est AA >60 >60 ml/min/1.73m2    GFR est non-AA >60 >60 ml/min/1.73m2    Calcium 8.7 8.5 - 10.1 MG/DL    Bilirubin, total 0.2 0.2 - 1.0 MG/DL    ALT (SGPT) 17 13 - 56 U/L    AST (SGOT) 24 15 - 37 U/L    Alk.  phosphatase 100 45 - 117 U/L    Protein, total 8.3 (H) 6.4 - 8.2 g/dL    Albumin 3.8 3.4 - 5.0 g/dL    Globulin 4.5 (H) 2.0 - 4.0 g/dL    A-G Ratio 0.8 0.8 - 1.7     HCG QL SERUM    Collection Time: 01/18/18  8:25 PM   Result Value Ref Range    HCG, Ql. NEGATIVE  NEG     CARDIAC PANEL,(CK, CKMB & TROPONIN)    Collection Time: 01/18/18  8:25 PM   Result Value Ref Range    CK 75 26 - 192 U/L    CK - MB 1.6 <3.6 ng/ml    CK-MB Index 2.1 0.0 - 4.0 %    Troponin-I, Qt. 0.02 0.0 - 0.045 NG/ML   ETHYL ALCOHOL    Collection Time: 01/18/18  8:25 PM   Result Value Ref Range    ALCOHOL(ETHYL),SERUM <3 0 - 3 MG/DL   PHOSPHORUS    Collection Time: 01/18/18  8:25 PM   Result Value Ref Range    Phosphorus 2.3 (L) 2.5 - 4.9 MG/DL   DRUG SCREEN, URINE    Collection Time: 01/18/18  9:10 PM   Result Value Ref Range    BENZODIAZEPINES POSITIVE (A) NEG      BARBITURATES NEGATIVE  NEG      THC (TH-CANNABINOL) NEGATIVE  NEG      OPIATES NEGATIVE  NEG      PCP(PHENCYCLIDINE) NEGATIVE  NEG      COCAINE NEGATIVE  NEG      AMPHETAMINES POSITIVE (A) NEG      METHADONE NEGATIVE       HDSCOM (NOTE)    URINALYSIS W/ RFLX MICROSCOPIC    Collection Time: 01/18/18  9:10 PM   Result Value Ref Range    Color YELLOW      Appearance CLOUDY      Specific gravity 1.012 1.005 - 1.030      pH (UA) 6.5 5.0 - 8.0      Protein NEGATIVE  NEG mg/dL    Glucose NEGATIVE  NEG mg/dL    Ketone NEGATIVE  NEG mg/dL    Bilirubin NEGATIVE  NEG      Blood NEGATIVE  NEG      Urobilinogen 0.2 0.2 - 1.0 EU/dL    Nitrites NEGATIVE  NEG      Leukocyte Esterase TRACE (A) NEG     URINE MICROSCOPIC ONLY    Collection Time: 01/18/18  9:10 PM   Result Value Ref Range    WBC 0 to 3 0 - 4 /hpf    RBC 0 to 3 0 - 5 /hpf    Epithelial cells 2+ 0 - 5 /lpf    Bacteria 3+ (A) NEG /hpf    CA Oxalate crystals 3+ (A) NEG   GLUCOSE, POC    Collection Time: 01/18/18 11:44 PM   Result Value Ref Range    Glucose (POC) 107 70 - 110 mg/dL       EKG interpretation by ED Physician:  Sinus tach with rbbb (old) ns st changes laterally  Rate 110, pr 172, qtc 489  Rate has increased from previous; rbbb old        X-Ray, CT or other radiology findings or impressions:  CT HEAD WO CONT    (Results Pending)   MRI BRAIN W WO CONT    (Results Pending)   ct head withnap    Progress notes, Consult notes or additional Procedure notes:   No further seizures. C/w new onset seizure  Seen by dr Jaclyn Ochoa on call for teleneuro who rec admission, mri, eeg in am. Hold further keppra until after eeg  D/w Dr Altagracia Dockery who will admit  Pt with h/o iron def anemia and current anemia not c/w etiology to seizure      Reevaluation of patient:   Stable for admission    Disposition:  Diagnosis:   1. New onset seizure ([address]    2. Scalp hematoma, initial encounter    3. Chronic anemia        Disposition: admit    Follow-up Information     None            Current Discharge Medication List      CONTINUE these medications which have NOT CHANGED    Details   multivitamin with iron (DAILY MULTI-VITAMINS/IRON) tablet Take 1 Tab by mouth daily. omeprazole (PRILOSEC) 20 mg capsule Take 1 Cap by mouth daily. Qty: 30 Cap, Refills: 0      dextroamphetamine-amphetamine (ADDERALL) 30 mg tablet Take 30 mg by mouth two (2) times a day. Indications: ATTENTION-DEFICIT HYPERACTIVITY DISORDER      ondansetron (ZOFRAN ODT) 4 mg disintegrating tablet Take 1 Tab by mouth every six (6) hours as needed for Nausea. Qty: 8 Tab, Refills: 0      docusate sodium (COLACE) 100 mg capsule Take 1 Cap by mouth two (2) times a day. Indications: Stool Softner  Qty: 60 Cap, Refills: 0      polyethylene glycol (MIRALAX) 17 gram/dose powder Take 17 g by mouth daily.  1 tablespoon with 8 oz of water daily  Indications: CONSTIPATION  Qty: 850 g, Refills: 0

## 2018-01-19 NOTE — PROGRESS NOTES
The patient developed claustrophobia approximately 10 minutes into the MRI Brain exam and was not able to continue. Her RN was called to determine if the patient could receive any medicine to make her more comfortable. The pt cannot receive anything at this time and the RN recommended returning the patient to the floor with the expectation that the pt would return to MRI at a later time after medicated.

## 2018-01-19 NOTE — PROGRESS NOTES
conducted a brief initial consultation with Robbie Bland, who is a 40 y.o.,female. Patient stated that she wasn't feeling well when  inquired and agreed that she was not up for a visit at this time. Patients primary language is: [place]. According to the patients EMR, her Mandaen affiliation is: No Yazidism. The  provided the following interventions:  Initiated a relationship of care and support. Provided information about Spiritual Care Services. Offered silent prayer and, to the patient, assurance of continued prayers on her behalf. Chart reviewed. The following outcome was achieved:  Patient expressed gratitude for the 's visit. Assessment:  Patient did not indicate any Mandaen/cultural needs that will affect her preferences in health care. Patient did not indicate any spiritual or Mandaen issues which require further Spiritual Care Services interventions at this time. Plan:  Chaplains will continue to follow and will provide pastoral care on an as-needed/requested basis.     [facility]  Board Certified [address] [place] [address] Esau Valentine   [phone]

## 2018-01-19 NOTE — H&P
History and Physical    Patient: Curly Newberry               Sex: female          DOA: 1/18/2018       YOB: 1975      Age:  40 y.o.        LOS:  LOS: 0 days        Chief Complaint   Patient presents with    Syncope         HPI:     Curly Newberry is a 40 y.o. female who presents with seizure like activity. Patient  reports that patient was watching TV from her computer stand and suddenly screamed , following closely with tonic movement and fall . Patient hit her head at edge of table going down . She had LOC for a brief 1 min or so. Also c/o oral trauma and urine incontinent. She was briefly post ictal but now back to baseline. She denies personal or family hx of seizure. She takes addrerall for ADHD and Xanax for anxiety. She also c/o headache. CT head shows frontal left scalp hematoma. Patient was given a loading dose of Keppra in ED. She was seen by tele neurology who recommended seizure work up and hold further AED for now. Past Medical History:   Diagnosis Date    Adult ADHD     Chronic narcotic dependence ([facility] [address] 08/12/2016    See [place] Prescription Monitoring Program Report, See note for this visit.  CTS (carpal tunnel syndrome) 05/05/2016    Xiomara Wayne    Fatty infiltration of liver 10/11/2007    Noted on RUQ U/S.     Gastric ulcer 02/26/2013    GI Dr. Heredia Carrier: Noted on EGD.  H/O: upper GI bleed 02/26/2013    GI Dr. Heredia Carrier: EGD Revealed: Upper GI Bleed w/ ulcer noted BELOW G-J Anastomosis treated with 2 Endoclips.  Helicobacter pylori ab+ 10/11/2007    Hx MRSA infection     LLE Calf Abscess MRSA +     Hyperlipidemia     Knee pain 05/13/2016    Xiomara Cross    Low back pain     Melena     Microcytic anemia     Odontalgia 01/18/2016    OMFS Dr. Lalit Ca 01/08/2016    Dr. Washington Son DDS    Odontalgia 01/11/2016    Dr. Washington Son DDS    Recurrent UTI     Vitamin D insufficiency 2009   . Past Surgical History:   Procedure Laterality Date    HX CARPAL TUNNEL RELEASE Right 2016    Dr. Skyla Alvarado      HX  SECTION  2009    HX CYST INCISION AND DRAINAGE      LLE Calf Abscess + MRSA    HX ENDOSCOPY  2013    GI Dr. Maryuri Núñez: EGD Revealed: Upper GI Bleed w/ ulcer noted BELOW G-J Anastomosis treated with 2 Endoclips.  HX GASTRIC BYPASS  2008    Dr. Jai Zendejas       No current facility-administered medications on file prior to encounter. Current Outpatient Prescriptions on File Prior to Encounter   Medication Sig Dispense Refill    multivitamin with iron (DAILY MULTI-VITAMINS/IRON) tablet Take 1 Tab by mouth daily.  omeprazole (PRILOSEC) 20 mg capsule Take 1 Cap by mouth daily. 30 Cap 0    ondansetron (ZOFRAN ODT) 4 mg disintegrating tablet Take 1 Tab by mouth every six (6) hours as needed for Nausea. 8 Tab 0    dextroamphetamine-amphetamine (ADDERALL) 30 mg tablet Take 30 mg by mouth two (2) times a day. Indications: ATTENTION-DEFICIT HYPERACTIVITY DISORDER      docusate sodium (COLACE) 100 mg capsule Take 1 Cap by mouth two (2) times a day. Indications: Stool Softner 60 Cap 0    polyethylene glycol (MIRALAX) 17 gram/dose powder Take 17 g by mouth daily. 1 tablespoon with 8 oz of water daily  Indications: CONSTIPATION 850 g 0       Social History     Social History    Marital status:      Spouse name: N/A    Number of children: N/A    Years of education: N/A     Occupational History    Not on file.      Social History Main Topics    Smoking status: Never Smoker    Smokeless tobacco: Never Used    Alcohol use Yes      Comment: occ    Drug use: No    Sexual activity: Not on file     Other Topics Concern    Not on file     Social History Narrative       Prior to Admission Medications   Prescriptions Last Dose Informant Patient Reported? Taking?   dextroamphetamine-amphetamine (ADDERALL) 30 mg tablet   Yes No   Sig: Take 30 mg by mouth two (2) times a day. Indications: ATTENTION-DEFICIT HYPERACTIVITY DISORDER   docusate sodium (COLACE) 100 mg capsule   No No   Sig: Take 1 Cap by mouth two (2) times a day. Indications: Stool Softner   multivitamin with iron (DAILY MULTI-VITAMINS/IRON) tablet   Yes No   Sig: Take 1 Tab by mouth daily. omeprazole (PRILOSEC) 20 mg capsule   No No   Sig: Take 1 Cap by mouth daily. ondansetron (ZOFRAN ODT) 4 mg disintegrating tablet   No No   Sig: Take 1 Tab by mouth every six (6) hours as needed for Nausea. polyethylene glycol (MIRALAX) 17 gram/dose powder   No No   Sig: Take 17 g by mouth daily. 1 tablespoon with 8 oz of water daily  Indications: CONSTIPATION      Facility-Administered Medications: None       History reviewed. No pertinent family history. No Known Allergies  Review of Systems   Constitutional: Negative. HENT: Negative. Eyes: Negative. Respiratory: Negative. Cardiovascular: Negative. Gastrointestinal: Negative. Genitourinary: Negative. Musculoskeletal: Negative. Skin: Negative. Neurological: Positive for seizures and headaches. Psychiatric/Behavioral: Negative. Physical Exam:       Visit Vitals    BP (!) 162/95    Pulse 94    Temp 98.3 °F (36.8 °C)    Resp 17    SpO2 100%       Physical Exam   Constitutional: She is oriented to person, place, and time. She appears well-developed and well-nourished. No distress. HENT:   Head:       Mouth/Throat: No oropharyngeal exudate. Left scalp hematoma    Eyes: Conjunctivae are normal. No scleral icterus. Neck: Neck supple. Cardiovascular: Normal rate, regular rhythm and normal heart sounds. No murmur heard. Pulmonary/Chest: Effort normal and breath sounds normal. No respiratory distress. She has no wheezes. She has no rales. Abdominal: Soft.  Bowel sounds are normal. Musculoskeletal: She exhibits no edema. Neurological: She is alert and oriented to person, place, and time. Skin: Skin is warm. She is not diaphoretic. Psychiatric: She has a normal mood and affect. Ancillary Studies: All lab and imaging reviewed for the past 24 hours. Recent Results (from the past 24 hour(s))   CBC WITH AUTOMATED DIFF    Collection Time: 01/18/18  8:25 PM   Result Value Ref Range    WBC 6.7 4.6 - 13.2 K/uL    RBC 4.59 4.20 - 5.30 M/uL    HGB 8.8 (L) 12.0 - 16.0 g/dL    HCT 28.9 (L) 35.0 - 45.0 %    MCV 63.0 (L) 74.0 - 97.0 FL    MCH 19.2 (L) 24.0 - 34.0 PG    MCHC 30.4 (L) 31.0 - 37.0 g/dL    RDW 17.9 (H) 11.6 - 14.5 %    PLATELET 282 632 - 230 K/uL    NEUTROPHILS 67 40 - 73 %    LYMPHOCYTES 26 21 - 52 %    MONOCYTES 5 3 - 10 %    EOSINOPHILS 1 0 - 5 %    BASOPHILS 1 0 - 2 %    ABS. NEUTROPHILS 4.6 1.8 - 8.0 K/UL    ABS. LYMPHOCYTES 1.7 0.9 - 3.6 K/UL    ABS. MONOCYTES 0.3 0.05 - 1.2 K/UL    ABS. EOSINOPHILS 0.0 0.0 - 0.4 K/UL    ABS. BASOPHILS 0.0 0.0 - 0.06 K/UL    DF AUTOMATED     METABOLIC PANEL, COMPREHENSIVE    Collection Time: 01/18/18  8:25 PM   Result Value Ref Range    Sodium 136 136 - 145 mmol/L    Potassium 4.0 3.5 - 5.5 mmol/L    Chloride 99 (L) 100 - 108 mmol/L    CO2 24 21 - 32 mmol/L    Anion gap 13 3.0 - 18 mmol/L    Glucose 117 (H) 74 - 99 mg/dL    BUN 8 7.0 - 18 MG/DL    Creatinine 0.76 0.6 - 1.3 MG/DL    BUN/Creatinine ratio 11 (L) 12 - 20      GFR est AA >60 >60 ml/min/1.73m2    GFR est non-AA >60 >60 ml/min/1.73m2    Calcium 8.7 8.5 - 10.1 MG/DL    Bilirubin, total 0.2 0.2 - 1.0 MG/DL    ALT (SGPT) 17 13 - 56 U/L    AST (SGOT) 24 15 - 37 U/L    Alk.  phosphatase 100 45 - 117 U/L    Protein, total 8.3 (H) 6.4 - 8.2 g/dL    Albumin 3.8 3.4 - 5.0 g/dL    Globulin 4.5 (H) 2.0 - 4.0 g/dL    A-G Ratio 0.8 0.8 - 1.7     HCG QL SERUM    Collection Time: 01/18/18  8:25 PM   Result Value Ref Range    HCG, Ql. NEGATIVE  NEG     CARDIAC PANEL,(CK, CKMB & TROPONIN) Collection Time: 01/18/18  8:25 PM   Result Value Ref Range    CK 75 26 - 192 U/L    CK - MB 1.6 <3.6 ng/ml    CK-MB Index 2.1 0.0 - 4.0 %    Troponin-I, Qt. 0.02 0.0 - 0.045 NG/ML   ETHYL ALCOHOL    Collection Time: 01/18/18  8:25 PM   Result Value Ref Range    ALCOHOL(ETHYL),SERUM <3 0 - 3 MG/DL   DRUG SCREEN, URINE    Collection Time: 01/18/18  9:10 PM   Result Value Ref Range    BENZODIAZEPINES POSITIVE (A) NEG      BARBITURATES NEGATIVE  NEG      THC (TH-CANNABINOL) NEGATIVE  NEG      OPIATES NEGATIVE  NEG      PCP(PHENCYCLIDINE) NEGATIVE  NEG      COCAINE NEGATIVE  NEG      AMPHETAMINES POSITIVE (A) NEG      METHADONE NEGATIVE       HDSCOM (NOTE)    URINALYSIS W/ RFLX MICROSCOPIC    Collection Time: 01/18/18  9:10 PM   Result Value Ref Range    Color YELLOW      Appearance CLOUDY      Specific gravity 1.012 1.005 - 1.030      pH (UA) 6.5 5.0 - 8.0      Protein NEGATIVE  NEG mg/dL    Glucose NEGATIVE  NEG mg/dL    Ketone NEGATIVE  NEG mg/dL    Bilirubin NEGATIVE  NEG      Blood NEGATIVE  NEG      Urobilinogen 0.2 0.2 - 1.0 EU/dL    Nitrites NEGATIVE  NEG      Leukocyte Esterase TRACE (A) NEG     URINE MICROSCOPIC ONLY    Collection Time: 01/18/18  9:10 PM   Result Value Ref Range    WBC 0 to 3 0 - 4 /hpf    RBC 0 to 3 0 - 5 /hpf    Epithelial cells 2+ 0 - 5 /lpf    Bacteria 3+ (A) NEG /hpf    CA Oxalate crystals 3+ (A) NEG       Assessment/Plan     Principal Problem:    Seizure (HCC) (1/18/2018)    Active Problems:    Microcytic anemia ()      Hematoma of frontal scalp (1/18/2018)      ADHD  Anxiety   Hx narcotic dependence   Elevated BP    PLAN:    Seizure   - new onset   - etiology unknown r/o side effect of drugs amphetamine    - CT head reviewed - frontal scalp hematoma   - Seizure precaution   - MRI brain ordered  - EEG ordered  - Neurology consult   - Loading dose Keppra in ED.  Per tele neurology hold further AED treatment for now    Scalp hematoma   - s/p Fall  - pain control as needed    Microcytic anemia   - Check Ferritin. iron profile ordered    ADHD   - On adderall    Anxiety   - On Xanax     Elevated BP   - Monitor BP closely     DVT prophylaxis - SCD    Full code     Dawna Mazariegos MD  1/18/2018  10:32 PM

## 2018-01-19 NOTE — CONSULTS
Neurology Consult Note    Admit Date: 1/18/2018  Length of Stay: 1  Primary Care: Nicholas Willis MD   Principle Problem: Seizure [facility])     Assessment:    Seizure    Plan:    Seizure:    EEG and MRI are ordered. With first time seizure would not start AEDs unless there is a separate event. No driving for 6 months. Discussed this with patient and her . History: Ms. Aleida Harvey is a 42yo   female with PMH of ADHD, GI bleed, and chronic narcotic dependence who is being seen for first time seizure. She was was sitting at computer, screamed and was noted to have shaking, fell over hit head. Activity lasted about 1.5 min and was noted to be confused after. Also urinary incontinence and tongue injury. She was seen by tele-neurology. She was given 1500mg of Keppra in the ED. [address] is not currently in place. She had been on Adderall 30mg BID for her ADHD but stopped 3 days ago. She was prescribed xanax but had not taken any. Initial CT of head was negative for any acute processes. Results reviewed:     CT Results (most recent):    Results from [place] encounter on 01/18/18   CT HEAD WO CONT   Narrative EXAM: CT head    INDICATION: Syncope, struck head. Left frontal scalp hematoma. COMPARISON: None    TECHNIQUE: Axial CT imaging of the head was performed without intravenous  contrast.  Sagittal and coronal reconstructions were created from the axial  data. One or more dose reduction techniques were used on this CT: automated  exposure control, adjustment of the mAs and/or kVp according to patient's size,  and iterative reconstruction techniques. The specific techniques utilized on  this CT exam have been documented in the patient's electronic medical record.    _______________    FINDINGS:    BRAIN AND POSTERIOR FOSSA: The sulci, folia, ventricles and basal cisterns are  within normal limits for the patient?s age.   No intracranial hemorrhage, mass  effect, or midline shift. No areas of abnormal parenchymal attenuation. EXTRA-AXIAL SPACES AND MENINGES: No extra-axial fluid collections. CALVARIUM: Small left frontal scalp hematoma is present. No fracture. SINUSES: Visualized portions of the paranasal sinuses and the mastoid air cells  are clear. OTHER: None.    _______________         Impression IMPRESSION:    No acute intracranial abnormalities. Small left frontal scalp hematoma. A preliminary report was given by the radiology resident on call. MRI Results (most recent):  No results found for this or any previous visit.     Latest Hemoglobin A1C:  No results found for: HBA1C, HGBE8, QPE1DQEU, XHB4NDHR    Latest Cardiology Procedure:  Results for orders placed or performed during the hospital encounter of 01/18/18   EKG, 12 LEAD, INITIAL   Result Value Ref Range    Ventricular Rate 110 BPM    Atrial Rate 110 BPM    P-R Interval 172 ms    QRS Duration 142 ms    Q-T Interval 362 ms    QTC Calculation (Bezet) 489 ms    Calculated P Axis 69 degrees    Calculated R Axis -34 degrees    Calculated T Axis 29 degrees    Diagnosis       Sinus tachycardia  Left axis deviation  Right bundle branch block  Abnormal ECG  When compared with ECG of 12-AUG-2016 17:43,  No significant change was found  Confirmed by Yovani Hawk ([id number]) on 1/19/2018 8:20:42 AM         Important Labs:  No results found for: FOL, RBCF  No results found for: CHOL, CHOLPOCT, CHOLX, CHLST, CHOLV, HDL, HDLPOC, LDL, LDLCPOC, LDLC, DLDLP, VLDLC, VLDL, TGLX, TRIGL, TRIGP, TGLPOCT, CHHD, CHHDX  No results found for: TSH, TSH2, TSH3, TSHP, TSHELE, TT3, T3U, T3UP, FRT3, FT3, FT4, FT4P, T4, T4P, FT4T, TT7, TSHEXT  No results found for: DS35, PHEN, PHENO, PHENT, DILF, DS39, PHENY, PTN, VALF2, VALAC, VALP, VALPR, DS6, CRBAM, CRBAMP, CARB2, XCRBAM  Discussed with: Dr. Yayo Nielsen    Allergies: No Known Allergies   Review of Systems:    Y  N   Constitutional: [] [x] recent weight change  [] [x] fever  [] [x] sleep difficulties   ENT/Mouth:  [] [] hearing loss  [] [x] swallowing problems  [] [x] slurred speech   Cardiovascular:  [] [x] chest pain   [] [x] palpitations    Respiratory: [] [x] cough with swallow  [] [x] shortness of breath  [] [] sleep apnea  [] [] intubated   Gastrointestinal: [] [x] abdominal pain  [] [x] nausea   Genitourinary: [] [x] frequent urination  [] [] incontinence    Musculoskeletal:   [] [x] joint pain  [] [x] muscle pain   Integument:   [] [] rash/itching   Neurological:  [] [x] dizziness/vertigo  [] [] sedation  [] [x] confusion  [] [x] agitation/combativeness  [] [x] loss of consciousness  [] [x] numbness/tingling sensation  [] [x] tremors  [] [x] weakness in limbs  [] [] difficulty with balance  [] [x] frequent or recurring headaches  [] [] memory loss   [] [] comatose  [x] [] seizures   Psychiatric:   [] [] depression  [] [] hallucinations   Endocrine: [] [] excessive thirst or urination   [] [] heat or cold intolerance   Hematologic/Lymphatic: [] [] bleeding tendency  [] [] enlarged lymph nodes     PMH:   Past Medical History:   Diagnosis Date    Adult ADHD     Chronic narcotic dependence ([facility] [address] 08/12/2016    See [address] [place] [address] Prescription Monitoring Program Report, See note for this visit.  CTS (carpal tunnel syndrome) 05/05/2016    Fuad White    Fatty infiltration of liver 10/11/2007    Noted on RUQ U/S.     Gastric ulcer 02/26/2013    GI Dr. Rivas Reach: Noted on EGD.  H/O: upper GI bleed 02/26/2013    GI Dr. Rob Singer: EGD Revealed: Upper GI Bleed w/ ulcer noted BELOW G-J Anastomosis treated with 2 Endoclips.  Helicobacter pylori ab+ 10/11/2007    Hx MRSA infection     LLE Calf Abscess MRSA +     Hyperlipidemia     Knee pain 05/13/2016    Fuad Cross    Low back pain     Melena     Microcytic anemia     Odontalgia 01/18/2016    [facility] Dr. Jayde Rebolledo 01/08/2016    Dr. Zay Ramirez DDS    Odontalgia 01/11/2016    Dr. Zay Ramirez DDS    Recurrent UTI     Vitamin D insufficiency 12/31/2009        Problem List: Principal Problem:    Seizure ([address] (1/18/2018)    Active Problems:    Microcytic anemia ()      Hematoma of frontal scalp (1/18/2018)        FH: History reviewed. No pertinent family history. SH:   Social History     Social History    Marital status:      Spouse name: N/A    Number of children: N/A    Years of education: N/A     Social History Main Topics    Smoking status: Never Smoker    Smokeless tobacco: Never Used    Alcohol use Yes      Comment: occ    Drug use: No    Sexual activity: Not Asked     Other Topics Concern    None     Social History Narrative          Vital Signs:   Visit Vitals    /83 (BP 1 Location: Left arm, BP Patient Position: At rest)    Pulse 82    Temp 98.3 °F (36.8 °C)    Resp 18    Ht 5' 6\" (1.676 m)    Wt 84.2 kg (185 lb 11.2 oz)    SpO2 96%    Breastfeeding No    BMI 29.97 kg/m2      Neurological examination:    Appearance: In no acute distress, well developed, well nourished, cooperative   Cardiovascular: Heart is regular rate and rhythm, peripheral edema is absent. No carotid bruits heard   Mental status examination: Alert and oriented X 4. Normal speech and language. Normal attention, memory and fund of knowledge.  Cranial Nerves:     I: smell Not tested   II: visual fields Visual fields were intact to confrontation  Eye movements were full and conjugate, saccades were accurate, pursuit movements were smooth, and there was no nystagmus. II: pupils Equal, round, reactive to light   III,VII: ptosis none   III,IV,VI: extraocular muscles  Full ROM   V: facial light touch sensation  normal   V,VII: corneal reflex     VII: facial muscle function  normal   VIII: hearing    IX: soft palate elevation  Normal. The palate and tongue moved in the midline.      IX,X: gag reflex present   XI: sternocleidomastoid strength 5/5   XII: tongue strength  Normal.        Motor exam: Station, gait:  deferred. Muscle tone, bulk and manual muscle testing: normal. Spacticity absent.  Sensory: Intact to primary modalities.  Coordination: Normal rapid alternating movements, finger to nose testing.  Reflexes: Symmetric and 1+ in bilateral biceps, triceps, brachioradialis, patellar, ankle reflexes. Plantar reflexes are flexor. Medications:      [x] REVIEWED  Current Facility-Administered Medications   Medication    HYDROcodone-acetaminophen (NORCO) 5-325 mg per tablet 1 Tab    sodium chloride (NS) flush 5-10 mL    sodium chloride (NS) flush 5-10 mL    0.9% sodium chloride infusion    LORazepam (ATIVAN) injection 2 mg    acetaminophen (TYLENOL) tablet 650 mg     Data:      [x] REVIEWED  Recent Results (from the past 24 hour(s))   EKG, 12 LEAD, INITIAL    Collection Time: 01/18/18  8:23 PM   Result Value Ref Range    Ventricular Rate 110 BPM    Atrial Rate 110 BPM    P-R Interval 172 ms    QRS Duration 142 ms    Q-T Interval 362 ms    QTC Calculation (Bezet) 489 ms    Calculated P Axis 69 degrees    Calculated R Axis -34 degrees    Calculated T Axis 29 degrees    Diagnosis       Sinus tachycardia  Left axis deviation  Right bundle branch block  Abnormal ECG  When compared with ECG of 12-AUG-2016 17:43,  No significant change was found  Confirmed by Kimberly Carey ([id number]) on 1/19/2018 8:20:42 AM     CBC WITH AUTOMATED DIFF    Collection Time: 01/18/18  8:25 PM   Result Value Ref Range    WBC 6.7 4.6 - 13.2 K/uL    RBC 4.59 4.20 - 5.30 M/uL    HGB 8.8 (L) 12.0 - 16.0 g/dL    HCT 28.9 (L) 35.0 - 45.0 %    MCV 63.0 (L) 74.0 - 97.0 FL    MCH 19.2 (L) 24.0 - 34.0 PG    MCHC 30.4 (L) 31.0 - 37.0 g/dL    RDW 17.9 (H) 11.6 - 14.5 %    PLATELET 977 455 - 948 K/uL    NEUTROPHILS 67 40 - 73 %    LYMPHOCYTES 26 21 - 52 %    MONOCYTES 5 3 - 10 %    EOSINOPHILS 1 0 - 5 %    BASOPHILS 1 0 - 2 %    ABS. NEUTROPHILS 4.6 1.8 - 8.0 K/UL    ABS. LYMPHOCYTES 1.7 0.9 - 3.6 K/UL    ABS. MONOCYTES 0.3 0.05 - 1.2 K/UL    ABS. EOSINOPHILS 0.0 0.0 - 0.4 K/UL    ABS. BASOPHILS 0.0 0.0 - 0.06 K/UL    DF AUTOMATED     METABOLIC PANEL, COMPREHENSIVE    Collection Time: 01/18/18  8:25 PM   Result Value Ref Range    Sodium 136 136 - 145 mmol/L    Potassium 4.0 3.5 - 5.5 mmol/L    Chloride 99 (L) 100 - 108 mmol/L    CO2 24 21 - 32 mmol/L    Anion gap 13 3.0 - 18 mmol/L    Glucose 117 (H) 74 - 99 mg/dL    BUN 8 7.0 - 18 MG/DL    Creatinine 0.76 0.6 - 1.3 MG/DL    BUN/Creatinine ratio 11 (L) 12 - 20      GFR est AA >60 >60 ml/min/1.73m2    GFR est non-AA >60 >60 ml/min/1.73m2    Calcium 8.7 8.5 - 10.1 MG/DL    Bilirubin, total 0.2 0.2 - 1.0 MG/DL    ALT (SGPT) 17 13 - 56 U/L    AST (SGOT) 24 15 - 37 U/L    Alk.  phosphatase 100 45 - 117 U/L    Protein, total 8.3 (H) 6.4 - 8.2 g/dL    Albumin 3.8 3.4 - 5.0 g/dL    Globulin 4.5 (H) 2.0 - 4.0 g/dL    A-G Ratio 0.8 0.8 - 1.7     HCG QL SERUM    Collection Time: 01/18/18  8:25 PM   Result Value Ref Range    HCG, Ql. NEGATIVE  NEG     CARDIAC PANEL,(CK, CKMB & TROPONIN)    Collection Time: 01/18/18  8:25 PM   Result Value Ref Range    CK 75 26 - 192 U/L    CK - MB 1.6 <3.6 ng/ml    CK-MB Index 2.1 0.0 - 4.0 %    Troponin-I, Qt. 0.02 0.0 - 0.045 NG/ML   ETHYL ALCOHOL    Collection Time: 01/18/18  8:25 PM   Result Value Ref Range    ALCOHOL(ETHYL),SERUM <3 0 - 3 MG/DL   PHOSPHORUS    Collection Time: 01/18/18  8:25 PM   Result Value Ref Range    Phosphorus 2.3 (L) 2.5 - 4.9 MG/DL   FERRITIN    Collection Time: 01/18/18  8:25 PM   Result Value Ref Range    Ferritin 3 (L) 8 - 388 NG/ML   IRON PROFILE    Collection Time: 01/18/18  8:25 PM   Result Value Ref Range    Iron 20 (L) 50 - 175 ug/dL    TIBC 536 (H) 250 - 450 ug/dL    Iron % saturation 4 %   DRUG SCREEN, URINE    Collection Time: 01/18/18  9:10 PM   Result Value Ref Range    BENZODIAZEPINES POSITIVE (A) NEG      BARBITURATES NEGATIVE NEG      THC (TH-CANNABINOL) NEGATIVE  NEG      OPIATES NEGATIVE  NEG      PCP(PHENCYCLIDINE) NEGATIVE  NEG      COCAINE NEGATIVE  NEG      AMPHETAMINES POSITIVE (A) NEG      METHADONE NEGATIVE       HDSCOM (NOTE)    URINALYSIS W/ RFLX MICROSCOPIC    Collection Time: 01/18/18  9:10 PM   Result Value Ref Range    Color YELLOW      Appearance CLOUDY      Specific gravity 1.012 1.005 - 1.030      pH (UA) 6.5 5.0 - 8.0      Protein NEGATIVE  NEG mg/dL    Glucose NEGATIVE  NEG mg/dL    Ketone NEGATIVE  NEG mg/dL    Bilirubin NEGATIVE  NEG      Blood NEGATIVE  NEG      Urobilinogen 0.2 0.2 - 1.0 EU/dL    Nitrites NEGATIVE  NEG      Leukocyte Esterase TRACE (A) NEG     URINE MICROSCOPIC ONLY    Collection Time: 01/18/18  9:10 PM   Result Value Ref Range    WBC 0 to 3 0 - 4 /hpf    RBC 0 to 3 0 - 5 /hpf    Epithelial cells 2+ 0 - 5 /lpf    Bacteria 3+ (A) NEG /hpf    CA Oxalate crystals 3+ (A) NEG   GLUCOSE, POC    Collection Time: 01/18/18 11:44 PM   Result Value Ref Range    Glucose (POC) 107 70 - 536 mg/dL   METABOLIC PANEL, COMPREHENSIVE    Collection Time: 01/19/18  5:05 AM   Result Value Ref Range    Sodium 139 136 - 145 mmol/L    Potassium 3.7 3.5 - 5.5 mmol/L    Chloride 105 100 - 108 mmol/L    CO2 25 21 - 32 mmol/L    Anion gap 9 3.0 - 18 mmol/L    Glucose 101 (H) 74 - 99 mg/dL    BUN 7 7.0 - 18 MG/DL    Creatinine 0.57 (L) 0.6 - 1.3 MG/DL    BUN/Creatinine ratio 12 12 - 20      GFR est AA >60 >60 ml/min/1.73m2    GFR est non-AA >60 >60 ml/min/1.73m2    Calcium 7.9 (L) 8.5 - 10.1 MG/DL    Bilirubin, total 0.2 0.2 - 1.0 MG/DL    ALT (SGPT) 11 (L) 13 - 56 U/L    AST (SGOT) 10 (L) 15 - 37 U/L    Alk.  phosphatase 76 45 - 117 U/L    Protein, total 6.3 (L) 6.4 - 8.2 g/dL    Albumin 3.0 (L) 3.4 - 5.0 g/dL    Globulin 3.3 2.0 - 4.0 g/dL    A-G Ratio 0.9 0.8 - 1.7     CBC WITH AUTOMATED DIFF    Collection Time: 01/19/18  5:05 AM   Result Value Ref Range    WBC 5.3 4.6 - 13.2 K/uL    RBC 3.57 (L) 4.20 - 5.30 M/uL    HGB 6.8 (L) 12.0 - 16.0 g/dL    HCT 22.6 (L) 35.0 - 45.0 %    MCV 63.3 (L) 74.0 - 97.0 FL    MCH 19.0 (L) 24.0 - 34.0 PG    MCHC 30.1 (L) 31.0 - 37.0 g/dL    RDW 17.8 (H) 11.6 - 14.5 %    PLATELET 953 283 - 977 K/uL    MPV 9.2 9.2 - 11.8 FL    NEUTROPHILS 59 40 - 73 %    LYMPHOCYTES 32 21 - 52 %    MONOCYTES 8 3 - 10 %    EOSINOPHILS 1 0 - 5 %    BASOPHILS 0 0 - 2 %    ABS. NEUTROPHILS 3.1 1.8 - 8.0 K/UL    ABS. LYMPHOCYTES 1.7 0.9 - 3.6 K/UL    ABS. MONOCYTES 0.4 0.05 - 1.2 K/UL    ABS. EOSINOPHILS 0.0 0.0 - 0.4 K/UL    ABS.  BASOPHILS 0.0 0.0 - 0.06 K/UL    DF AUTOMATED     GLUCOSE, POC    Collection Time: 01/19/18  7:50 AM   Result Value Ref Range    Glucose (POC) 91 70 - 110 mg/dL       Natalia No NP

## 2018-01-19 NOTE — PROGRESS NOTES
*ATTENTION:  This note has been created by a medical student for educational purposes only. Please do not refer to the content of this note for clinical decision-making, billing, or other purposes. Please see attending physicians note to obtain clinical information on this patient. *       Student Progress Note  Please refer to attendings daily rounding note for full details      Patient: Rin Blankenship MRN: [id number]  CSN: [id number]    YOB: 1975  Age: 40 y.o. Sex: female    DOA: 1/18/2018 LOS:  LOS: 1 day          Chief Complaint:      Subjective:   HPI:   Ms. Nicki Tamayo is a 41 yo F with a medical history significant for ADHD, narcotic dependence, microcytic anemia, and recurrent UTIs presented to the ED via EMS on 1/18/18 after witnessed seizure-like episode with head trauma and LOC x 1.5 minutes. She does not remember the episode or any type of aura, her first memory is being taken to the hospital by EMS. Her  states she was sitting at a desk, suddenly stood screaming, then fell to the floor hitting her head on a table. Once on the ground she began shaking (predominantly her UEs). Upon arrival to ED she was found to have bite injury to tongue, urinary incontinence and postictal confusion. She has no history of seizures or strokes, but states her mother recently had a seizure during a UTI and her mother's brother had epilepsy. Home meds include xanax and adderall but states she does not take adderall anymore. Denies tobacco, alcohol, marijuana and illicit drug use. Denies fever, chills, recent URI. Interval History:  Currently has headache, given [place] at 0942  Tongue in pain from bite injury  Denies any neuro symptoms since admission, including blurred vision, vomiting, change in sense of taste or smell, tinnitus or paresthesias.   H/H: 6.8/22.6      PMH:     Allergies:    Surgical History  -C Section x 4  -Gastric bypass  -GI ulcer: G-J anastomosis with endoclip placement (2013)    Home Meds:   Xanax - anxiety  Adderal - ADHD    FamHx:  Mother: History of single seizure during UTI (age 61), living  Maternal Uncle: Epilepsy    Social Hx:  Home life: Lives at home with  and 4 children  Occupation:  Tobacco:  Alcohol:  Illicit Drugs:    ROS:  Constitutional: Denies weight loss, fatigue, fever, chills, night sweats. Skin: Denies rash, pruritis, changes in hair/nails. HEENT: Denies headache, vision changes, tinnitus, hearing loss, rhinitis, sore throat. Respiratory: Denies cough, SOB, wheezing. Cardiovascular: Denies chest pain, edema, orthopnea, claudication. Gastrointestinal: Denies abdomen pain, N/V/D, dark or bloody stool. Genitourinary: Denies dysuria, frequency, hematuria. Musculoskeletal: Denies arthralgia, swelling, myalgia. Neurologic: Denies dizziness, weakness, seizures, paresthesia, tremor, syncope. Hematologic: Denies easy bruising/bleeding. Endocrine: Denies polyuria, polydipsia, polyphagia, heat/cold intolerance. Psychiatric: Denies anxiety, depression, hallucinations, suicidal ideations. Objective:      Visit Vitals    /83 (BP 1 Location: Left arm, BP Patient Position: At rest)    Pulse 82    Temp 98.3 °F (36.8 °C)    Resp 18    Ht 5' 6\" (1.676 m)    Wt 84.2 kg (185 lb 11.2 oz)    SpO2 96%    Breastfeeding No    BMI 29.97 kg/m2         Physical Exam:  General: Well appearing, well nourished. Alert and cooperative, in NAD. Appropriate mood and affect. Skin: Warm, dry, and intact without lesions, erythema, bruising, or pallor. HEENT: Normocephalic, atraumatic. PERRL, EOMI, vision grossly intact. Hearing grossly intact. Neck supple, without lymphadenopathy or thyromegaly. Trachea midline. Cardiovascular: RRR. No peripheral edema, cyanosis, or pallor. Radial and dorsalis pedis pulses strong and equal bilaterally. Capillary refill <2 seconds. Respiratory: CTAB without wheezes. Abdominal: Soft, non-distended, non-tender. Normoactive bowel sounds. No guarding or rebound tenderness. No CVA tenderness. Musculoskeletal: Normal muscular development. ROM spine and extremities grossly intact. Neurologic: CN II-XII intact. Strength and sensation intact and symmetric throughout. I have reviewed the patient's Labs and Radiology studies. Assessment/Plan:   Ms. Juanito Scott is a 41 yo F with a medical history significant for ADHD, narcotic dependence, microcytic anemia, and recurrent UTIs presented to the ED via EMS on 1/18/18 after witnessed seizure-like episode with head trauma and LOC x 1.5 minutes. Upon arrival to ED she was found to have bite trauma to tongue, urinary incontinence and post-ictal confusion. She has been admitted for further neurological evaluation and monitoring. 1. Seizure   -First episode, etiology unknown   -Plan for MRI and EEG   -Keppra given in ED, further AED treatment held currently  2. Frontal scalp hematoma, s/p Fall   -Confirmed by CT scan 1/18/17, results above. -Treat headache with acetaminophen and Norco PRN   -Monitor for delayed-onset neurological symptoms from continued bleeding.   3. DVT prophylaxis   -SCDs in place    Nallely Macedo  1/19/2018  10:48 AM

## 2018-01-19 NOTE — ROUTINE PROCESS
Bedside and Verbal shift change report given to Rosalio Anton (oncoming nurse) by jdoy whyte rn (offgoing nurse). Report given with SBAR, Kardex, Intake/Output and Recent Results.

## 2018-01-19 NOTE — PROGRESS NOTES
Assumed care of pt from night nurse Aysha RN. Received with patient in bed, resting, aroused easily. Patient has complaint of headache unrelieved by tylenol and patient concerned with taking ibuprofen due to history of gastric ulcers. Call light in reach. Family at bedside. Encouraged to call for assistance, Pt verbalized understanding. [id number] - Dr. Crowell returned call to unit, advised of patient c/o headache and above, telephone order for Norco 5-325 every 6 hours as needed for pain.  RBV.    1444 - Patient discharge paperwork completed by this nurse, Elvia Munoz RN assigned patient will go over discharge and ensure patient discharged from facility.

## 2018-01-19 NOTE — PROGRESS NOTES
Problem: Falls - Risk of  Goal: *Absence of Falls  Document Spring Fall Risk and appropriate interventions in the flowsheet.   Outcome: Progressing Towards Goal  Fall Risk Interventions:

## 2018-01-19 NOTE — ED TRIAGE NOTES
Pt arrived via rescue from home with c/o syncopal episode while sitting at computer. Pt hit head. Hematoma noted on left side of forehead.

## 2018-01-19 NOTE — PROGRESS NOTES
Problem: Falls - Risk of  Goal: *Absence of Falls  Document Spring Fall Risk and appropriate interventions in the flowsheet.    Outcome: Progressing Towards Goal  Fall Risk Interventions:            Medication Interventions: Patient to call before getting OOB         History of Falls Interventions: Door open when patient unattended

## 2018-01-19 NOTE — PROCEDURES
[address] [place] [address]                             ELECTROENCEPHALOGRAM    PATIENT:     Laury Dockery   : 1975  CSN:           [id number]       MRN:           [id number]  PROCEDURE: EEG      PROCEDURE DATE:  18        HISTORY: Ms. Laury Dockery is a 42yo   female with PMH of ADHD, GI bleed, and chronic narcotic dependence who is being seen for first time seizure. She was was sitting at computer, made a loud moan fell over on to the floor and was noted to have shaking of all of her extremities for 1.5 minutes. She was noted to be confused after. Also urinary incontinence and tongue injury. MEDICATIONS: Motrin, Xanax ( on for only 2 days). TECHNICAL DESCRIPTION:   This digital EEG was recorded using 21 scalp and ear and 2 EKG electrodes. It was reviewed in referential and bipolar montages following the 10-[employer] Electrode  Placement System. During the recording, the patient was awake, drowsy, and achieved brief stage 1 sleep. The waking background was well organized and characterized by symmetric frontally predominant beta activity with a 8.5 Hz symmetric posterior dominant rhythm. During drowsiness, the  background was replaced by diffuse beta activity and slow rolling eye movements. During stage 1 sleep vertex waves were seen. No epileptiform discharges or seizures were noted. PHOTIC STIMULATION:   Stepped photic stimulation was performed from 1 to 30 Hz the patient had symmetric occipital driving from 8 to 15 Hz. EKG: The patient was in normal sinus rhythm and no ectopic beats were seen. IMPRESSION:   This is a normal electroencephalogram capturing wakefulness and stage 1 sleep. No focal, lateralized or epileptiform abnormalities were noted.       Ariel Dai MD  Neurology  4:33 PM, 18

## 2018-01-19 NOTE — PROGRESS NOTES
[facility]   Discharge Planning/ Assessment    Reasons for Intervention: Chart reviewed and pt/ spouse verified demographics. She has [facility] medicaid, but when I asked, did not know what diagnosis she got it with. She is not currently disabled. She lives with her  ( with different last name) and 2 children under the age of 25, not sure if older children are in home at present. Patient is independent with ADL, has not used Home Health or DME in past. Her  Ana Middleton [phone] will drive and participate in dc process. Her plan is home.     High Risk Criteria  [] Yes  [x]No   Physician Referral  [] Yes  []No        Date    Nursing Referral  [] Yes  []No        Date    Patient/Family Request  [] Yes  []No        Date       Resources:    Medicare  [] Yes  []No   Medicaid  [x] Yes  []No   No Resources  [] Yes  []No   Private Insurance  [] Yes  []No    Name/Phone Number    Other  [] Yes  []No        (i.e. Workman's Comp)         Prior Services:    Prior Services  [] Yes  [x]No   Home Health  [] Yes  []No   [address]  [] Yes  []No        Number of [address]  [] Yes  []No       Meals on Wheels  [] Yes  []No   Office on Aging  [] Yes  []No   Transportation Services  [] Yes  []No   Nursing Home  [] Yes  []No        Nursing Home Name    [facility]  [] Yes  []No        [address] Name    Other       Information Source:      Information obtained from  [x] Patient  [] Parent   [] [address]  [] Child  [x] Spouse   [] Significant Other/Partner   [] Friend      [] EMS    [] Nursing Home Chart          [] Other:   Chart Review  [x] Yes  []No     Family/Support System:    Patient lives with  [] Alone    [x] Spouse   [] Significant Other  [x] Children  [] Caretaker   [] Parent  [] Sibling     [] Other       Other Support System:    Is the patient responsible for care of others  [x] Yes  []No   Information of person caring for patient on  discharge    Managers financial affairs independently  [x] Yes  []No   If no, explain:      Status Prior to Admission:    Mental Status  [x] Awake  [] Alert  [] Oriented  [] Quiet/Calm [] Lethargic/Sedated   [] Disoriented  [] Restless/Anxious  [] Combative   Personal Care  [] Dependent  [x] [address]  [] Requires Assistance   Meal Preparation Ability  [x] Independent   [] Standby Assistance   [] Minimal Assistance   [] Moderate Assistance  [] Maximum Assistance     [] Total Assistance   Chores  [x] Independent with Chores   [] N/A Nursing Home Resident   [] Requires Assistance   Bowel/Bladder  [x] Continent  [] Catheter  [] Incontinent  [] Ostomy Self-Care    [] Urine Diversion Self-Care  [] Maximum Assistance     [] Total Assistance   Number of Persons needed for assistance    DME at home  [] [employer]  [] [employer], [employer]   [] Commode    [] Bathroom/Grab Bars  [] Hospital Bed  [] Nebulizer  [] Oxygen           [] Raised Toilet Seat  [] Shower Chair  [] Side Rails for Bed   [] Tub Transfer Bench   [] Chavez Rinks  [] Verita Jayleen, Standard      [] Other:   Vendor      Treatment Presently Receiving:    Current Treatments  [] Chemotherapy  [] Dialysis  [] Insulin  [] IVAB [x] IVF   [] O2  [] PCA   [] PT   [] RT   [] Tube Feedings   [] Wound Care     Psychosocial Evaluation:    Verbalized Knowledge of Disease Process  [] Patient  []Family   Coping with Disease Process  [] Patient  []Family   Requires Further Counseling Coping with Disease Process  [] Patient  []Family     Identified Projected Needs:    Home Health Aid  [] Yes  []No   Transportation  [] Yes  []No   Education  [] Yes  []No        Specific Education     Financial Counseling  [] Yes  []No   Inability to Care for Self/Will Require 24 hour care  [] Yes  []No   Pain Management  [] Yes  []No   Home Infusion Therapy  [] Yes  []No   Oxygen Therapy  [] Yes  []No   DME  [] Yes  []No   Long Term Care Placement  [] Yes  []No   Rehab  [] Yes  []No Physical Therapy  [] Yes  []No   Needs Anticipated At This Time  [] Yes  [x]No     Intra-Hospital Referral:    [address]  [] Yes  []No     [] Yes  []No   Patient Representative  [] Yes  []No   Staff for Teaching Needs  [] Yes  []No   Specialty Teaching Needs     Diabetic Educator  [] Yes  []No   Referral for Diabetic Educator Needed  [] Yes  []No  If Yes, place order for Nutritionist or Diabetic Consult     Tentative Discharge Plan:    Home with No Services  [x] Yes  []No   Home with [address] [facility] [address]  [] Yes  []No        If Yes, specify type    Home Care Program  [] Yes  []No        If Yes, specify type    Meals on Wheels  [] Yes  []No   Office of Aging  [] Yes  []No   NHP  [] Yes  []No   Return to the Nursing Home  [] Yes  []No   Rehab Therapy  [] Yes  []No   Acute Rehab  [] Yes  []No   Subacute Rehab  [] Yes  []No   Private Care  [] Yes  []No   Substance Abuse Referral  [] Yes  []No   Transportation  [] Yes  []No   Chore Service  [] Yes  []No   Inpatient Hospice  [] Yes  []No   OP RT  [] Yes  [] No   OP Hemo  [] Yes  [] No   OP PT  [] Yes  []No   Support Group  [] Yes  []No   Reach to Recovery  [] Yes  []No   OP Oncology Clinic  [] Yes  []No   Clinic Appointment  [] Yes  []No   DME  [] Yes  []No   Comments    Name of D/C Planner or  Given to Patient or Family Orestes Paulino RN   Phone Number         Extension [phone]   Date 01/19/18   Time    If you are discharged home, whom do you designate to participate in your discharge plan and receive any information needed?      Enter name of designee         Phone # of designee         Address of designee         Updated         Patient refused to designate any           individual

## 2018-01-19 NOTE — PROGRESS NOTES
PT orders received and chart reviewed. Holding mobilization at this time d/t low H/H 6.8/22. 6. Will follow up pending updated labs to maximize patient participation and safety.        Thank you,     Aida Rodriguez, PT, DPT

## 2018-01-19 NOTE — PROGRESS NOTES
Daily Progress Note: 1/19/2018 3:59 PM   Admit Date: 1/18/2018    Patient seen in follow up for multiple medical problems as listed below:  Patient Active Problem List   Diagnosis Code    Helicobacter pylori ab+ R76.8    Vitamin D insufficiency E55.9    Microcytic anemia D50.9    Recurrent UTI N39.0    Fatty infiltration of liver K76.0    Hyperlipidemia E78.5    Melena K92.1    Low back pain M54.5    Hx MRSA infection Z86.14    H/O: upper GI bleed Z87.19    Gastric ulcer K25.9    Knee pain M25.569    Odontalgia K08.89    CTS (carpal tunnel syndrome) G56.00    Chronic narcotic dependence ([facility] [address] F11.20    Adult ADHD F90.9    Seizure ([facility] [address] R56.9    Hematoma of frontal scalp S00. 03XA       Assesment     Ms. Christine Abdi is a 44yo   female with PMH of ADHD, GI bleed, and chronic narcotic dependence who is being seen for first time seizure. She was was sitting at computer, screamed and was noted to have shaking, fell over hit head. Activity lasted about 1.5 min and was noted to be confused after. Also urinary incontinence and tongue injury. She was seen by tele-neurology. She was given 1500mg of Keppra in the ED. Delbertomari Jonathan is not currently in place. She had been on Adderall 30mg BID for her ADHD but stopped 3 days ago. She was prescribed xanax and took 2 days of this. Initial CT of head was negative for any acute processes. Seizure   - new onset, etiology unknown -possible xanax  - CT head reviewed - frontal scalp hematoma   - Seizure precaution   - MRI brain ordered  - Neurology consulting   - Loading dose Keppra in ED. Per neurology hold further AED treatment for now     Scalp hematoma   - s/p Fall  - pain control as needed     Microcytic anemia   - Check Ferritin. iron profile ordered     ADHD   - On adderall     Anxiety   - On Xanax      Elevated BP   - Monitor BP closely     DVT Protocol Active: yes  Code Status:  Full Code     Disposition: home when MRI reviewed    Subjective:     CC: Syncope    Interval History: no overnight issues. Denies new medications-neuro reports 2d of xanax    ROS: 11 point ROS negative except for    Objective:     Visit Vitals    /83 (BP 1 Location: Left arm, BP Patient Position: At rest)    Pulse 82    Temp 98.3 °F (36.8 °C)    Resp 18    Ht 5' 6\" (1.676 m)    Wt 84.2 kg (185 lb 11.2 oz)    SpO2 96%    Breastfeeding No    BMI 29.97 kg/m2       Temp (24hrs), Av.3 °F (36.8 °C), Min:97.9 °F (36.6 °C), Max:98.6 °F (37 °C)        Intake/Output Summary (Last 24 hours) at 18 1559  Last data filed at 18 0700   Gross per 24 hour   Intake           833.33 ml   Output                0 ml   Net           833.33 ml       Gen: AOx3, NAD  HEENT:  ALFONSO, EOMI. Neck: No Bruits/JVD   Lungs:   CTAB. Good respiratory effort  Heart:   RR S1 S2 without M/R/G  Abdomen: ND,NT, BSX4,   Extremities:   No LE edema. No cyanosis. Skin:  no jaundice/lesions  Neuro: Neuro: CN2-12 intact. Strength 5/5 UE/LE sensation 5/5 UE/LE no pronator drift. Normal FNF    Data Review:     Meds/Labs/Tests reviewed    Current Shift:     Last three shifts:   1901 -  0700  In: 833.3 [P.O.:120;  I.V.:713.3]  Out: -   Recent Labs      18   1342  18   0505  18   WBC  4.0*  5.3  6.7   RBC  3.84*  3.57*  4.59   HGB  7.3*  6.8*  8.8*   HCT  24.1*  22.6*  28.9*   PLT  425*  389  416   GRANS   --   59  67   LYMPH   --   32  26   EOS   --   1  1       Recent Labs      18   0505  18   BUN  7  8   CREA  0.57*  0.76   CA  7.9*  8.7   ALB  3.0*  3.8   K  3.7  4.0   NA  139  136   CL  105  99*   CO2  25  24   PHOS   --   2.3*   GLU  101*  117*        Lab Results   Component Value Date/Time    Glucose 101 2018 05:05 AM    Glucose 117 2018 08:25 PM    Glucose 90 2016 05:22 PM          Care coordination with Nursing/Consultants/staff: 15  Prior history, labs, and charting reviewed: 15    Procedures/Imaging:  CT head  MRI head    Total time spent with chart review, patient examination/education, discussion with staff on case,documentation and medication management / adjustment  :  30 Minutes      Dr Mika Calero  [phone]

## 2018-01-19 NOTE — PROGRESS NOTES
Arrived from ED via stretcher,in no acute distress. Pt voided in the bathroom upon arrival.Seizure precautions. Instructed to call for assistance for safety. 2330 Dual skin assessment with CAROLE Powers. Pt has hematoma above left eye form fall prior to admission. 1/19/18 0140 Pt states Tylenol has not relieved her headache yet. Will notify MD.  2134 Hospitalist has been paged x 2 but has not returned call yet. Repaged. [phone]  returned call & notified of pt's headache not relieved with Tylenol. Received one time order for Ibuprofen. Awaiting for pharmacist to verify order. 0400 Pt sleeping,no s/s distress. 0600 No seizure activity.

## 2018-01-19 NOTE — ACP (ADVANCE CARE PLANNING)
Patient has designated ________husband________________ to participate in his/her discharge plan and to receive any needed information.      Name: Lalo Rivera  Address:  Phone number: [phone]

## 2018-01-20 RX ORDER — FERROUS FUMARATE 324(106)MG
1 TABLET ORAL 3 TIMES DAILY
Qty: 180 TAB | Refills: 1 | Status: SHIPPED | OUTPATIENT
Start: 2018-01-20

## 2018-01-20 NOTE — DISCHARGE INSTRUCTIONS
.STOP TAKING [place] from Nurse    PATIENT INSTRUCTIONS:    After general anesthesia or intravenous sedation, for 24 hours or while taking prescription Narcotics:  · Limit your activities  · Do not drive and operate hazardous machinery  · Do not make important personal or business decisions  · Do  not drink alcoholic beverages  · If you have not urinated within 8 hours after discharge, please contact your surgeon on call. Report the following to your surgeon:  · Excessive pain, swelling, redness or odor of or around the surgical area  · Temperature over 100.5  · Nausea and vomiting lasting longer than 4 hours or if unable to take medications  · Any signs of decreased circulation or nerve impairment to extremity: change in color, persistent  numbness, tingling, coldness or increase pain  · Any questions    What to do at Home:  Recommended activity: as physician advised    If you experience any of the following symptoms listed under Warning Signs of a Heart Attack, Signs and Symptoms of a Stroke and \"When to Call for Help\" listed under discharge teaching, please follow up with your primary care physician and/or call 911. *  Please give a list of your current medications to your Primary Care Provider. *  Please update this list whenever your medications are discontinued, doses are      changed, or new medications (including over-the-counter products) are added. *  Please carry medication information at all times in case of emergency situations. These are general instructions for a healthy lifestyle:    No smoking/ No tobacco products/ Avoid exposure to second hand smoke  Surgeon General's Warning:  Quitting smoking now greatly reduces serious risk to your health.     Obesity, smoking, and sedentary lifestyle greatly increases your risk for illness    A healthy diet, regular physical exercise & weight monitoring are important for maintaining a healthy lifestyle    You may be retaining fluid if you have a history of heart failure or if you experience any of the following symptoms:  Weight gain of 3 pounds or more overnight or 5 pounds in a week, increased swelling in our hands or feet or shortness of breath while lying flat in bed. Please call your doctor as soon as you notice any of these symptoms; do not wait until your next office visit. Recognize signs and symptoms of STROKE:    F-face looks uneven    A-arms unable to move or move unevenly    S-speech slurred or non-existent    T-time-call 911 as soon as signs and symptoms begin-DO NOT go       Back to bed or wait to see if you get better-TIME IS BRAIN. Warning Signs of HEART ATTACK     Call 911 if you have these symptoms:   Chest discomfort. Most heart attacks involve discomfort in the center of the chest that lasts more than a few minutes, or that goes away and comes back. It can feel like uncomfortable pressure, squeezing, fullness, or pain.  Discomfort in other areas of the upper body. Symptoms can include pain or discomfort in one or both arms, the back, neck, jaw, or stomach.  Shortness of breath with or without chest discomfort.  Other signs may include breaking out in a cold sweat, nausea, or lightheadedness. Don't wait more than five minutes to call 911 - MINUTES MATTER! Fast action can save your life. Calling 911 is almost always the fastest way to get lifesaving treatment. Emergency Medical Services staff can begin treatment when they arrive -- up to an hour sooner than if someone gets to the hospital by car. The discharge information has been reviewed with the patient. The patient verbalized understanding. Discharge medications reviewed with the patient and appropriate educational materials and side effects teaching were provided.   ___________________________________________________________________________________________________________________________________  Head Injury: Care Instructions  Your Care Instructions    Most injuries to the head are minor. Bumps, cuts, and scrapes on the head and face usually heal well and can be treated the same as injuries to other parts of the body. Although it's rare, once in a while a more serious problem shows up after you are home. So it's good to be on the lookout for symptoms for a day or two. Follow-up care is a key part of your treatment and safety. Be sure to make and go to all appointments, and call your doctor if you are having problems. It's also a good idea to know your test results and keep a list of the medicines you take. How can you care for yourself at home? · Follow your doctor's instructions. He or she will tell you if you need someone to watch you closely for the next 24 hours or longer. · Take it easy for the next few days or more if you are not feeling well. · Ask your doctor when it's okay for you to go back to activities like driving a car, riding a bike, or operating machinery. When should you call for help? Call 911 anytime you think you may need emergency care. For example, call if:  ? · You have a seizure. ? · You passed out (lost consciousness). ? · You are confused or can't stay awake. ?Call your doctor now or seek immediate medical care if:  ? · You have new or worse vomiting. ? · You feel less alert. ? · You have new weakness or numbness in any part of your body. ? Watch closely for changes in your health, and be sure to contact your doctor if:  ? · You do not get better as expected. ? · You have new symptoms, such as headaches, trouble concentrating, or changes in mood. Where can you learn more? Go to http://kiara-val.info/. Enter K438 in the search box to learn more about \"Head Injury: Care Instructions. \"  Current as of: October 14, 2016  Content Version: 11.4  © 1845-3205 [employer].  Care instructions adapted under license by [employer] (which disclaims liability or warranty for this information). If you have questions about a medical condition or this instruction, always ask your healthcare professional. [address] any warranty or liability for your use of this information. Seizure: Care Instructions  Your Care Instructions    Seizures are caused by abnormal patterns of electrical signals in the brain. They are different for each person. Seizures can affect movement, speech, vision, or awareness. Some people have only slight shaking of a hand and do not pass out. Other people may pass out and have violent shaking of the whole body. Some people appear to stare into space. They are awake, but they can't respond normally. Later, they may not remember what happened. You may need tests to identify the type and cause of the seizures. A seizure may occur only once, or you may have them more than one time. Taking medicines as directed and following up with your doctor may help keep you from having more seizures. The doctor has checked you carefully, but problems can develop later. If you notice any problems or new symptoms, get medical treatment right away. Follow-up care is a key part of your treatment and safety. Be sure to make and go to all appointments, and call your doctor if you are having problems. It's also a good idea to know your test results and keep a list of the medicines you take. How can you care for yourself at home? · Be safe with medicines. Take your medicines exactly as prescribed. Call your doctor if you think you are having a problem with your medicine. · Do not do any activity that could be dangerous to you or others until your doctor says it is safe to do so. For example, do not drive a car, operate machinery, swim, or climb ladders. · Be sure that anyone treating you for any health problem knows that you have had a seizure and what medicines you are taking for it.   · Identify and avoid things that may make you more likely to have a seizure. These may include lack of sleep, alcohol or drug use, stress, or not eating. · Make sure you go to your follow-up appointment. When should you call for help? Call 911 anytime you think you may need emergency care. For example, call if:  ? · You have another seizure. ? · You have more than one seizure in 24 hours. ? · You have new symptoms, such as trouble walking, speaking, or thinking clearly. ?Call your doctor now or seek immediate medical care if:  ? · You are not acting normally. ? Watch closely for changes in your health, and be sure to contact your doctor if you have any problems. Where can you learn more? Go to http://kiara-val.info/. Enter N076 in the search box to learn more about \"Seizure: Care Instructions. \"  Current as of: October 14, 2016  Content Version: 11.4  © 9978-7293 [employer]. Care instructions adapted under license by [employer] (which disclaims liability or warranty for this information). If you have questions about a medical condition or this instruction, always ask your healthcare professional. [place] [address] any warranty or liability for your use of this information. Anemia: Care Instructions  Your Care Instructions    Anemia is a low level of red blood cells, which carry oxygen throughout your body. Many things can cause anemia. Lack of iron is one of the most common causes. Your body needs iron to make hemoglobin, a substance in red blood cells that carries oxygen from the lungs to your body's cells. Without enough iron, the body produces fewer and smaller red blood cells. As a result, your body's cells do not get enough oxygen, and you feel tired and weak. And you may have trouble concentrating. Bleeding is the most common cause of a lack of iron. You may have heavy menstrual bleeding or bleeding caused by conditions such as ulcers, hemorrhoids, or cancer.  Regular use of aspirin or other anti-inflammatory medicines (such as ibuprofen) also can cause bleeding in some people. A lack of iron in your diet also can cause anemia, especially at times when the body needs more iron, such as during pregnancy, infancy, and the teen years. Your doctor may have prescribed iron pills. It may take several months of treatment for your iron levels to return to normal. Your doctor also may suggest that you eat foods that are rich in iron, such as meat and beans. There are many other causes of anemia. It is not always due to a lack of iron. Finding the specific cause of your anemia will help your doctor find the right treatment for you. Follow-up care is a key part of your treatment and safety. Be sure to make and go to all appointments, and call your doctor if you are having problems. It's also a good idea to know your test results and keep a list of the medicines you take. How can you care for yourself at home? · Take your medicines exactly as prescribed. Call your doctor if you think you are having a problem with your medicine. · If your doctor recommends iron pills, take them as directed:  ¨ Try to take the pills on an empty stomach about 1 hour before or 2 hours after meals. But you may need to take iron with food to avoid an upset stomach. ¨ Do not take antacids or drink milk or caffeine drinks (such as coffee, tea, or cola) at the same time or within 2 hours of the time that you take your iron. They can make it hard for your body to absorb the iron. ¨ Vitamin C (from food or supplements) helps your body absorb iron. Try taking iron pills with a glass of orange juice or some other food that is high in vitamin C, such as citrus fruits. ¨ Iron pills may cause stomach problems, such as heartburn, nausea, diarrhea, constipation, and cramps. Be sure to drink plenty of fluids, and include fruits, vegetables, and fiber in your diet each day.  Iron pills often make your bowel movements dark or green.  ¨ If you forget to take an iron pill, do not take a double dose of iron the next time you take a pill. ¨ Keep iron pills out of the reach of small children. An overdose of iron can be very dangerous. · Follow your doctor's advice about eating iron-rich foods. These include red meat, shellfish, poultry, eggs, beans, raisins, whole-grain bread, and leafy green vegetables. · Steam vegetables to help them keep their iron content. When should you call for help? Call 911 anytime you think you may need emergency care. For example, call if:  ? · You have symptoms of a heart attack. These may include:  ¨ Chest pain or pressure, or a strange feeling in the chest.  ¨ Sweating. ¨ Shortness of breath. ¨ Nausea or vomiting. ¨ Pain, pressure, or a strange feeling in the back, neck, jaw, or upper belly or in one or both shoulders or arms. ¨ Lightheadedness or sudden weakness. ¨ A fast or irregular heartbeat. After you call 911, the  may tell you to chew 1 adult-strength or 2 to 4 low-dose aspirin. Wait for an ambulance. Do not try to drive yourself. ? · You passed out (lost consciousness). ?Call your doctor now or seek immediate medical care if:  ? · You have new or increased shortness of breath. ? · You are dizzy or lightheaded, or you feel like you may faint. ? · Your fatigue and weakness continue or get worse. ? · You have any abnormal bleeding, such as:  ¨ Nosebleeds. ¨ Vaginal bleeding that is different (heavier, more frequent, at a different time of the month) than what you are used to. ¨ Bloody or black stools, or rectal bleeding. ¨ Bloody or pink urine. ? Watch closely for changes in your health, and be sure to contact your doctor if:  ? · You do not get better as expected. Where can you learn more? Go to http://kiara-val.info/. Enter R301 in the search box to learn more about \"Anemia: Care Instructions. \"  Current as of: October 13, 2016  Content Version: 11.4  © 2778-4460 [employer]. Care instructions adapted under license by [employer] (which disclaims liability or warranty for this information). If you have questions about a medical condition or this instruction, always ask your healthcare professional. [address] any warranty or liability for your use of this information. Hematoma: Care Instructions  Your Care Instructions    A hematoma is a bad bruise. It happens when an injury causes blood to collect and pool under the skin. The pooling blood gives the skin a spongy, rubbery, lumpy feel. A hematoma usually is not a cause for concern. It is not the same thing as a blood clot in a vein, and it does not cause blood clots. Follow-up care is a key part of your treatment and safety. Be sure to make and go to all appointments, and call your doctor if you are having problems. It's also a good idea to know your test results and keep a list of the medicines you take. How can you care for yourself at home? · Rest and protect the bruised area. · Put ice or a cold pack on the area for 10 to 20 minutes at a time. · Prop up the bruised area on a pillow when you ice it or anytime you sit or lie down during the next 3 days. Try to keep it above the level of your heart. This will help reduce swelling. · Wrapping the bruised area with an elastic bandage such as an Ace wrap will help decrease swelling. Don't wrap it too tightly, as this can cause more swelling below the affected area. · Take an over-the-counter pain medicine, such as acetaminophen (Tylenol), ibuprofen (Advil, Motrin), or naproxen (Aleve). · Do not take two or more pain medicines at the same time unless the doctor told you to. Many pain medicines have acetaminophen, which is Tylenol. Too much acetaminophen (Tylenol) can be harmful. When should you call for help?   Call your doctor now or seek immediate medical care if:  ? · You have signs of skin infection, such as:  ¨ Increased pain, swelling, warmth, or redness. ¨ Red streaks leading from the area. ¨ Pus draining from the area. ¨ A fever. ? Watch closely for changes in your health, and be sure to contact your doctor if:  ? · The bruise lasts longer than 4 weeks. ? · The bruise gets bigger or becomes more painful. ? · You do not get better as expected. Where can you learn more? Go to http://kiara-val.info/. Enter P911 in the search box to learn more about \"Hematoma: Care Instructions. \"  Current as of: March 20, 2017  Content Version: 11.4  © 0596-5420 [employer]. Care instructions adapted under license by [employer] (which disclaims liability or warranty for this information). If you have questions about a medical condition or this instruction, always ask your healthcare professional. [place] [address] any warranty or liability for your use of this information. Patient armband removed and shredded.

## 2018-01-20 NOTE — DISCHARGE SUMMARY
2 [facility]  Hospitalist Division    Discharge Summary      Patient: Curly Newberry MRN: [id number]  CSN: [id number]    YOB: 1975  Age: 40 y.o. Sex: female    DOA: 1/18/2018 LOS:  LOS: 1 day   Discharge Date: 01/20/18     PCP:  Jose Arreola MD    Chief Complaint:    Chief Complaint   Patient presents with    Syncope     Seizure [facility])    Admission Diagnosis:   Hospital Problems as of 1/19/2018  Never Reviewed          Codes Class Noted - Resolved POA    * (Principal)Seizure ([address] ICD-10-CM: R56.9  ICD-9-CM: 780.39  1/18/2018 - Present Unknown        Hematoma of frontal scalp ICD-10-CM: S00. 03XA  ICD-9-CM: 206  1/18/2018 - Present Yes        Microcytic anemia ICD-10-CM: D50.9  ICD-9-CM: 280.9  Unknown - Present Yes              Discharge Diagnoses:    Seizure   - new onset, etiology unknown -possible xanax  - CT head reviewed - frontal scalp hematoma   - Seizure precaution   - MRI brain ordered  - Neurology consulting   - Loading dose Keppra in ED. Per guidelines no further anti-epileptics until next episode      Scalp hematoma   - s/p Fall  - pain control as needed      Microcytic anemia MCV62  - Check Ferritin. iron profile ordered-very low. Start iron.       ADHD   - On adderall      Anxiety   - On Xanax -stop      Elevated BP   - Monitor BP closely        Hospital Course:   Ms. Curly Newberry is a 44yo [facility] female with PMH of ADHD, GI bleed, and chronic narcotic dependence who is being seen for first time seizure. Estella Ram was was sitting at computer, screamed and was noted to have shaking, fell over hit head. Activity lasted about 1.5 min and was noted to be confused after. Also urinary incontinence and tongue injury.  She was seen by tele-neurology. Estella Ram was given 1500mg of Keppra in the ED. Sheila Anne is not currently in place. She had been on Adderall 30mg BID for her ADHD but stopped 3 days ago. Estella Ram was prescribed xanax and took 2 days of this.   Initial CT of head was negative for any acute processes.  Neurology was consulted. MRI showed no acute infarct, mass or abnormality. Patient should be on 6month driving restriction. Recommend stopping xanax as this is the only new chemical that we know to be new. Follow-up neurology. Follow-up PCP and Gyn for iron deficiency anemia Hg 7.3 on discharge, Rx for iron TID [facility], voicemail left on cellphone. Significant Diagnostic Studies:  CT head  MRI head    Consults:  Treatment Team: Consulting Provider: Argentina Aguillno MD; Consulting Provider: Nicolas Parikh MD; Consulting Provider: Triston Askew MD; Staff Nurse: Howard Branch; Utilization Review: Sterling Drummond RN; Consulting Provider: Beata Roblero MD; Care Manager: Marin Bran RN; Physical Therapist: Sallie Buckley PT    Operative Procedures:  N/A    Disposition:  Home    Diet:  Cardiac    Discharge Condition:   Good    Discharge Medications:    Discharge Medication List as of 1/19/2018  7:54 PM      CONTINUE these medications which have NOT CHANGED    Details   multivitamin with iron (DAILY MULTI-VITAMINS/IRON) tablet Take 1 Tab by mouth daily. , Historical Med      omeprazole (PRILOSEC) 20 mg capsule Take 1 Cap by mouth daily. , Print, Disp-30 Cap, R-0      dextroamphetamine-amphetamine (ADDERALL) 30 mg tablet Take 30 mg by mouth two (2) times a day. Indications: ATTENTION-DEFICIT HYPERACTIVITY DISORDER, Historical Med      ondansetron (ZOFRAN ODT) 4 mg disintegrating tablet Take 1 Tab by mouth every six (6) hours as needed for Nausea. , Print, Disp-8 Tab, R-0      docusate sodium (COLACE) 100 mg capsule Take 1 Cap by mouth two (2) times a day. Indications: Stool Softner, Print, Disp-60 Cap, R-0      polyethylene glycol (MIRALAX) 17 gram/dose powder Take 17 g by mouth daily.  1 tablespoon with 8 oz of water daily  Indications: CONSTIPATION, Print, Disp-850 g, R-0             No results found for this or any previous visit (from the past 24 hour(s)). Follow-Up And Discharge Instructions:    Follow-up Information     Follow up With Details Comments Contact MD Neeta García MD Schedule an appointment as soon as possible for a visit neurology [address] [place] [address] [phone]              Wound Care/Supplies:   N/A      Dr Rosemarie Andrea        Time Spent:  35min    Cc: Jerald Palacios MD

## 2021-02-12 NOTE — PERIOP NOTES
PAT - SURGICAL PRE-ADMISSION INSTRUCTIONS    NAME:  Omar Strickland                                                          TODAY'S DATE:  2/12/2021    SURGERY DATE:  2/17/2021                                  SURGERY ARRIVAL TIME:   0530 tbv    1. Do NOT eat or drink anything, including candy or gum, after MIDNIGHT on 2/16/21 , unless you have specific instructions from your Surgeon or Anesthesia Provider to do so. 2. No smoking on the day of surgery. 3. No alcohol 24 hours prior to the day of surgery. 4. No recreational drugs for one week prior to the day of surgery. 5. Leave all valuables, including money/purse, at home. 6. Remove all jewelry, nail polish, makeup (including mascara); no lotions, powders, deodorant, or perfume/cologne/after shave. 7. Glasses/Contact lenses and Dentures may be worn to the hospital.  They will be removed prior to surgery. 8. Call your doctor if symptoms of a cold or illness develop within 24 ours prior to surgery. 9. AN ADULT MUST DRIVE YOU HOME AFTER OUTPATIENT SURGERY. 10. If you are having an OUTPATIENT procedure, please make arrangements for a responsible adult to be with you for 24 hours after your surgery. 11. If you are admitted to the hospital, you will be assigned to a bed after surgery is complete. Normally a family member will not be able to see you until you are in your assigned bed. 15. Family is encouraged to accompany you to the hospital.  We ask visitors in the treatment area to be limited to ONE person at a time to ensure patient privacy. EXCEPTIONS WILL BE MADE AS NEEDED. 15. Children under 12 are discouraged from entering the treatment area and need to be supervised by an adult when in the waiting room. Special Instructions:    NONE. Patient Prep:    shower with anti-bacterial soap    These surgical instructions were reviewed with patient during the PAT phone call. Directions: On the morning of surgery, please go to the main entrance. Sign in at the Registration Desk.     If you have any questions and/or concerns, please do not hesitate to call:  (Prior to the day of surgery)  [facility] unit:  [phone]  (Day of surgery)  [facility] unit:  [phone]

## 2021-02-12 NOTE — PERIOP NOTES
Called patient to inquire why she did not come for covid test; patient states that she didn't know that surgery was still on schedule.   She will come Monday (2/15/21) morning for test.

## 2021-02-15 ENCOUNTER — HOSPITAL ENCOUNTER (OUTPATIENT)
Dept: PREADMISSION TESTING | Age: 46
Discharge: HOME | End: 2021-02-15
Payer: MEDICAID

## 2021-02-15 ENCOUNTER — TRANSCRIBE ORDER (OUTPATIENT)
Dept: REGISTRATION | Age: 46
End: 2021-02-15

## 2021-02-15 DIAGNOSIS — Z20.828 EXPOSURE TO SARS-ASSOCIATED CORONAVIRUS: ICD-10-CM

## 2021-02-15 DIAGNOSIS — Z01.812 BLOOD TESTS PRIOR TO TREATMENT OR PROCEDURE: ICD-10-CM

## 2021-02-15 DIAGNOSIS — Z01.812 BLOOD TESTS PRIOR TO TREATMENT OR PROCEDURE: Primary | ICD-10-CM

## 2021-02-15 PROCEDURE — U0003 INFECTIOUS AGENT DETECTION BY NUCLEIC ACID (DNA OR RNA); SEVERE ACUTE RESPIRATORY SYNDROME CORONAVIRUS 2 (SARS-COV-2) (CORONAVIRUS DISEASE [COVID-19]), AMPLIFIED PROBE TECHNIQUE, MAKING USE OF HIGH THROUGHPUT TECHNOLOGIES AS DESCRIBED BY CMS-2020-01-R: HCPCS

## 2021-02-16 LAB — SARS-COV-2, COV2NT: NOT DETECTED

## 2021-02-17 ENCOUNTER — ANESTHESIA (OUTPATIENT)
Dept: SURGERY | Age: 46
End: 2021-02-17
Payer: MEDICAID

## 2021-02-17 ENCOUNTER — ANESTHESIA EVENT (OUTPATIENT)
Dept: SURGERY | Age: 46
End: 2021-02-17
Payer: MEDICAID

## 2021-02-17 ENCOUNTER — HOSPITAL ENCOUNTER (OUTPATIENT)
Age: 46
Setting detail: OUTPATIENT SURGERY
Discharge: HOME | End: 2021-02-17
Attending: DENTIST | Admitting: DENTIST
Payer: MEDICAID

## 2021-02-17 VITALS
WEIGHT: 229.38 LBS | SYSTOLIC BLOOD PRESSURE: 130 MMHG | TEMPERATURE: 97.7 F | RESPIRATION RATE: 14 BRPM | HEIGHT: 66 IN | DIASTOLIC BLOOD PRESSURE: 80 MMHG | HEART RATE: 79 BPM | BODY MASS INDEX: 36.86 KG/M2 | OXYGEN SATURATION: 95 %

## 2021-02-17 DIAGNOSIS — K02.9 CARIES: Primary | ICD-10-CM

## 2021-02-17 LAB
ABO + RH BLD: NORMAL
BLOOD GROUP ANTIBODIES SERPL: NORMAL
HCG UR QL: NEGATIVE
SPECIMEN EXP DATE BLD: NORMAL

## 2021-02-17 PROCEDURE — 74011250636 HC RX REV CODE- 250/636: Performed by: DENTIST

## 2021-02-17 PROCEDURE — 74011250636 HC RX REV CODE- 250/636: Performed by: NURSE ANESTHETIST, CERTIFIED REGISTERED

## 2021-02-17 PROCEDURE — 77030002888 HC SUT CHRMC J&J -A: Performed by: DENTIST

## 2021-02-17 PROCEDURE — 74011000250 HC RX REV CODE- 250: Performed by: NURSE ANESTHETIST, CERTIFIED REGISTERED

## 2021-02-17 PROCEDURE — 74011000250 HC RX REV CODE- 250: Performed by: DENTIST

## 2021-02-17 PROCEDURE — 2709999900 HC NON-CHARGEABLE SUPPLY: Performed by: DENTIST

## 2021-02-17 PROCEDURE — 76010000138 HC OR TIME 0.5 TO 1 HR: Performed by: DENTIST

## 2021-02-17 PROCEDURE — 77030028681 HC DRSG NSL ABSRB NASOPORE STRY -C: Performed by: DENTIST

## 2021-02-17 PROCEDURE — 74011000258 HC RX REV CODE- 258: Performed by: DENTIST

## 2021-02-17 PROCEDURE — 00170 ANES INTRAORAL PX NOS: CPT | Performed by: ANESTHESIOLOGY

## 2021-02-17 PROCEDURE — 77030014006 HC SPNG HEMSTAT J&J -A: Performed by: DENTIST

## 2021-02-17 PROCEDURE — 76060000032 HC ANESTHESIA 0.5 TO 1 HR: Performed by: DENTIST

## 2021-02-17 PROCEDURE — 76210000021 HC REC RM PH II 0.5 TO 1 HR: Performed by: DENTIST

## 2021-02-17 PROCEDURE — 86901 BLOOD TYPING SEROLOGIC RH(D): CPT

## 2021-02-17 PROCEDURE — 00170 ANES INTRAORAL PX NOS: CPT | Performed by: NURSE ANESTHETIST, CERTIFIED REGISTERED

## 2021-02-17 PROCEDURE — 76210000006 HC OR PH I REC 0.5 TO 1 HR: Performed by: DENTIST

## 2021-02-17 PROCEDURE — 81025 URINE PREGNANCY TEST: CPT

## 2021-02-17 PROCEDURE — 77030032490 HC SLV COMPR SCD KNE COVD -B: Performed by: DENTIST

## 2021-02-17 PROCEDURE — 74011250637 HC RX REV CODE- 250/637: Performed by: DENTIST

## 2021-02-17 RX ORDER — FENTANYL CITRATE 50 UG/ML
INJECTION, SOLUTION INTRAMUSCULAR; INTRAVENOUS AS NEEDED
Status: DISCONTINUED | OUTPATIENT
Start: 2021-02-17 | End: 2021-02-17 | Stop reason: HOSPADM

## 2021-02-17 RX ORDER — SODIUM CHLORIDE, SODIUM LACTATE, POTASSIUM CHLORIDE, CALCIUM CHLORIDE 600; 310; 30; 20 MG/100ML; MG/100ML; MG/100ML; MG/100ML
25 INJECTION, SOLUTION INTRAVENOUS
Status: COMPLETED | OUTPATIENT
Start: 2021-02-17 | End: 2021-02-17

## 2021-02-17 RX ORDER — SODIUM CHLORIDE, SODIUM LACTATE, POTASSIUM CHLORIDE, CALCIUM CHLORIDE 600; 310; 30; 20 MG/100ML; MG/100ML; MG/100ML; MG/100ML
25 INJECTION, SOLUTION INTRAVENOUS CONTINUOUS
Status: DISCONTINUED | OUTPATIENT
Start: 2021-02-17 | End: 2021-02-17 | Stop reason: HOSPADM

## 2021-02-17 RX ORDER — DEXAMETHASONE SODIUM PHOSPHATE 4 MG/ML
INJECTION, SOLUTION INTRA-ARTICULAR; INTRALESIONAL; INTRAMUSCULAR; INTRAVENOUS; SOFT TISSUE AS NEEDED
Status: DISCONTINUED | OUTPATIENT
Start: 2021-02-17 | End: 2021-02-17 | Stop reason: HOSPADM

## 2021-02-17 RX ORDER — HYDROMORPHONE HYDROCHLORIDE 1 MG/ML
0.5 INJECTION, SOLUTION INTRAMUSCULAR; INTRAVENOUS; SUBCUTANEOUS
Status: DISCONTINUED | OUTPATIENT
Start: 2021-02-17 | End: 2021-02-17 | Stop reason: HOSPADM

## 2021-02-17 RX ORDER — CHLORHEXIDINE GLUCONATE 1.2 MG/ML
15 RINSE ORAL EVERY 12 HOURS
Qty: 960 ML | Refills: 0 | Status: SHIPPED | OUTPATIENT
Start: 2021-02-17 | End: 2021-02-27

## 2021-02-17 RX ORDER — BUPIVACAINE HYDROCHLORIDE AND EPINEPHRINE 5; 5 MG/ML; UG/ML
INJECTION, SOLUTION EPIDURAL; INTRACAUDAL; PERINEURAL AS NEEDED
Status: DISCONTINUED | OUTPATIENT
Start: 2021-02-17 | End: 2021-02-17 | Stop reason: HOSPADM

## 2021-02-17 RX ORDER — FENTANYL CITRATE 50 UG/ML
50 INJECTION, SOLUTION INTRAMUSCULAR; INTRAVENOUS AS NEEDED
Status: DISCONTINUED | OUTPATIENT
Start: 2021-02-17 | End: 2021-02-17 | Stop reason: HOSPADM

## 2021-02-17 RX ORDER — LIDOCAINE HYDROCHLORIDE 20 MG/ML
INJECTION, SOLUTION EPIDURAL; INFILTRATION; INTRACAUDAL; PERINEURAL AS NEEDED
Status: DISCONTINUED | OUTPATIENT
Start: 2021-02-17 | End: 2021-02-17 | Stop reason: HOSPADM

## 2021-02-17 RX ORDER — FAMOTIDINE 20 MG/1
20 TABLET, FILM COATED ORAL ONCE
Status: COMPLETED | OUTPATIENT
Start: 2021-02-17 | End: 2021-02-17

## 2021-02-17 RX ORDER — ONDANSETRON 2 MG/ML
4 INJECTION INTRAMUSCULAR; INTRAVENOUS ONCE
Status: COMPLETED | OUTPATIENT
Start: 2021-02-17 | End: 2021-02-17

## 2021-02-17 RX ORDER — SODIUM CHLORIDE, SODIUM LACTATE, POTASSIUM CHLORIDE, CALCIUM CHLORIDE 600; 310; 30; 20 MG/100ML; MG/100ML; MG/100ML; MG/100ML
INJECTION, SOLUTION INTRAVENOUS
Status: DISCONTINUED | OUTPATIENT
Start: 2021-02-17 | End: 2021-02-17 | Stop reason: HOSPADM

## 2021-02-17 RX ORDER — SUCCINYLCHOLINE CHLORIDE 100 MG/5ML
SYRINGE (ML) INTRAVENOUS AS NEEDED
Status: DISCONTINUED | OUTPATIENT
Start: 2021-02-17 | End: 2021-02-17 | Stop reason: HOSPADM

## 2021-02-17 RX ORDER — PROPOFOL 10 MG/ML
INJECTION, EMULSION INTRAVENOUS AS NEEDED
Status: DISCONTINUED | OUTPATIENT
Start: 2021-02-17 | End: 2021-02-17 | Stop reason: HOSPADM

## 2021-02-17 RX ORDER — MIDAZOLAM HYDROCHLORIDE 1 MG/ML
INJECTION, SOLUTION INTRAMUSCULAR; INTRAVENOUS AS NEEDED
Status: DISCONTINUED | OUTPATIENT
Start: 2021-02-17 | End: 2021-02-17 | Stop reason: HOSPADM

## 2021-02-17 RX ORDER — OXYCODONE AND ACETAMINOPHEN 7.5; 325 MG/1; MG/1
1 TABLET ORAL
Qty: 20 TAB | Refills: 0 | Status: SHIPPED | OUTPATIENT
Start: 2021-02-17 | End: 2021-02-20

## 2021-02-17 RX ORDER — ONDANSETRON 2 MG/ML
INJECTION INTRAMUSCULAR; INTRAVENOUS AS NEEDED
Status: DISCONTINUED | OUTPATIENT
Start: 2021-02-17 | End: 2021-02-17 | Stop reason: HOSPADM

## 2021-02-17 RX ORDER — AMOXICILLIN 500 MG/1
500 CAPSULE ORAL 3 TIMES DAILY
Qty: 21 CAP | Refills: 0 | Status: SHIPPED | OUTPATIENT
Start: 2021-02-17 | End: 2021-02-24

## 2021-02-17 RX ORDER — ALPRAZOLAM 0.5 MG/1
TABLET ORAL
COMMUNITY

## 2021-02-17 RX ADMIN — FENTANYL CITRATE 100 MCG: 50 INJECTION, SOLUTION INTRAMUSCULAR; INTRAVENOUS at 10:22

## 2021-02-17 RX ADMIN — LIDOCAINE HYDROCHLORIDE 60 MG: 20 INJECTION, SOLUTION INTRAVENOUS at 10:22

## 2021-02-17 RX ADMIN — LIDOCAINE HYDROCHLORIDE 40 MG: 20 INJECTION, SOLUTION INTRAVENOUS at 10:30

## 2021-02-17 RX ADMIN — ONDANSETRON 4 MG: 2 INJECTION INTRAMUSCULAR; INTRAVENOUS at 11:35

## 2021-02-17 RX ADMIN — MIDAZOLAM 2 MG: 1 INJECTION INTRAMUSCULAR; INTRAVENOUS at 10:15

## 2021-02-17 RX ADMIN — SODIUM CHLORIDE, SODIUM LACTATE, POTASSIUM CHLORIDE, AND CALCIUM CHLORIDE 25 ML/HR: 600; 310; 30; 20 INJECTION, SOLUTION INTRAVENOUS at 06:53

## 2021-02-17 RX ADMIN — PROPOFOL 200 MG: 10 INJECTION, EMULSION INTRAVENOUS at 10:23

## 2021-02-17 RX ADMIN — FAMOTIDINE 20 MG: 20 TABLET ORAL at 06:52

## 2021-02-17 RX ADMIN — FENTANYL CITRATE 50 MCG: 50 INJECTION, SOLUTION INTRAMUSCULAR; INTRAVENOUS at 11:35

## 2021-02-17 RX ADMIN — PROPOFOL 150 MG: 10 INJECTION, EMULSION INTRAVENOUS at 10:30

## 2021-02-17 RX ADMIN — AMPICILLIN SODIUM 2 G: 2 INJECTION, POWDER, FOR SOLUTION INTRAMUSCULAR; INTRAVENOUS at 10:37

## 2021-02-17 RX ADMIN — DEXAMETHASONE SODIUM PHOSPHATE 8 MG: 4 INJECTION, SOLUTION INTRA-ARTICULAR; INTRALESIONAL; INTRAMUSCULAR; INTRAVENOUS; SOFT TISSUE at 10:38

## 2021-02-17 RX ADMIN — FENTANYL CITRATE 50 MCG: 50 INJECTION, SOLUTION INTRAMUSCULAR; INTRAVENOUS at 11:13

## 2021-02-17 RX ADMIN — ONDANSETRON 4 MG: 2 SOLUTION INTRAMUSCULAR; INTRAVENOUS at 10:50

## 2021-02-17 RX ADMIN — Medication 100 MG: at 10:23

## 2021-02-17 RX ADMIN — FENTANYL CITRATE 50 MCG: 50 INJECTION, SOLUTION INTRAMUSCULAR; INTRAVENOUS at 10:52

## 2021-02-17 RX ADMIN — SODIUM CHLORIDE, SODIUM LACTATE, POTASSIUM CHLORIDE, AND CALCIUM CHLORIDE: 600; 310; 30; 20 INJECTION, SOLUTION INTRAVENOUS at 10:15

## 2021-02-17 NOTE — PERIOP NOTES
Discharge instructions given via phone to Mr. Martha Cortez and reviewed with pt. Both acknowledged understanding. Written copy given.

## 2021-02-17 NOTE — PERIOP NOTES
Pt arrives from OR. Placed on monitors VSS. Chart, MAR and anesthesia record reviewed. Will monitor status  1148 updated Jose Alonso on pt status. Stable  Report to [facility]. Opportunity for questions offered, clarification provided.  VSS, pain managed

## 2021-02-17 NOTE — PERIOP NOTES
Scant amount of blood from nose noted. Oral gauze intact with moderate amt. Of blood noted. Denied nausea. Ice pack maintained on both cheeks.

## 2021-02-17 NOTE — ANESTHESIA PREPROCEDURE EVALUATION
Relevant Problems   No relevant active problems       Anesthetic History   No history of anesthetic complications            Review of Systems / Medical History  Patient summary reviewed, nursing notes reviewed and pertinent labs reviewed    Pulmonary  Within defined limits                 Neuro/Psych     seizures        Comments: One seizures Cardiovascular                  Exercise tolerance: >4 METS     GI/Hepatic/Renal           PUD and liver disease     Endo/Other        Obesity and anemia     Other Findings              Physical Exam    Airway  Mallampati: II  TM Distance: < 4 cm  Neck ROM: normal range of motion   Mouth opening: Normal     Cardiovascular    Rhythm: regular  Rate: normal         Dental    Dentition: Poor dentition     Pulmonary  Breath sounds clear to auscultation               Abdominal         Other Findings            Anesthetic Plan    ASA: 3  Anesthesia type: general          Induction: Intravenous  Anesthetic plan and risks discussed with: Patient

## 2021-02-17 NOTE — DISCHARGE INSTRUCTIONS
Christopher Santamaria    Appointment: We will call you with your post-op visit in several days from the time of your surgery. If you would like to reach us to schedule, then please call [phone]. POST-OPERATIVE INSTRUCTIONS AND INFORMATION FOR PATIENTS    Surgery of any kind places a stress on your body. Get adequate rest and avoid strenuous activity for a few days following your procedure. It is important for someone to stay with you until you have recovered from the effects of these medications. Swelling, discomfort, and restricted jaw function are expected, and need not cause alarm. These may be minimized by the following instructions. Please read them carefully. It is strongly urged that they be followed. 1.   BLEEDING  Apply constant pressure on the gauze over the surgical site. This acts as a pressure dressing to control any active bleeding. If bleeding is excessive, place a roll of gauze or a moistened tea bag over the surgical site and bite firmly for 45 minutes with constant pressure. Repeat if necessary. Avoid rinsing, spilling, smoking and drinking through a straw, as this will disturb the blood clot and reinitiate bleeding. Assume a semi-upright position; use two pillows in bed. If significant bleeding still continues, call the office for advice. 2.   SWELLING  Swelling and bruising are normal reactions to surgery, and vary from patient to patient. Application of ice to the surgical sites during the first 24 hours helps reduce the amount of swelling. Use the ice packs 20 minutes on the site 20 minutes off the site or alternate from side to side every 20 minutes. After the first 24 hours, the ice will not have much effect on swelling, but may make the surgical site feel better. Swelling reaches its peak about 48-72 hours after surgery. It is not unusual to have difficulty opening the mouth due to post-operative swelling in the muscles. This should resolve on its own with time.  Application of moist heat 4 to 6 times per day to the surgical sites beginning 24 hours after surgery, increases the blood circulation and helps take away swelling. Bruising will resolve on its own, but may take up to a week or more. 3.   INFECTION  Most surgical procedures in healthy patients have a low risk of developing an infection. Some patients may be placed on antibiotic medication. It is important to follow the directions on the label and take tlie medication until it is completely gone. If you develop hives or a rash, discontinue all medication and contact the office immediately. There may be a slight elevation in temperature for 24 to 48 hours after surgery. This is a normal body response to the surgery. If temperature persists or is elevated above 101 degrees Farenheit, please notify the office. 4. PAIN  After any surgical procedure, swelling and some discomfort are anticipated. Pain normally reaches its peak 48-72 hours after surgery, then starts to decrease. If you only have minor pain, try over-the counter drugs, such as Tylenol,. Advil, or Aleve. Avoid aspirin and aspirin containing products unless this medication was prescribed by another physician, as these interfere with blood clotting. If you have been given a prescription for a stronger pain medication, have it filled at a pharmacy and take the medication as directed. Pain medication should never be taken on an empty stomach. If you develop hives or a rash, discontinue all medication and contact the office immediately. 5.   NAUSEA  Post-operative nausea is usually due to swallowing a small amount of blood during and/or after surgery. A small amount of carbonated drink, such as 7-Up or ginger ale every hour for five to six hours wilt usually relieve this feeling. Sometimes pain medications can cause nausea. If nausea continues, contact the office. 6. DIET  A bland liquid diet is recommended for the day of surgery.  Following this, soft food high In protein and vitamins is recommended. Avoid crunchy foods, which may irritate the surgical site. Resume your normal diet as soon as possible. 7.   ORAL HYGIENE  Do not rinse your mouth for the first 12 hours following your surgery. This will loosen the blood clots and reinitiate bleeding. The day following surgery, the mouth should be rinsed with warm salt water 5 or 6 times a day after meals and at bedtime. If you were given an litigating syringe, use it to gently flush out the extraction site(s) with warm salt water 3-4 times per day, starting three days after surgery. You may brush your teeth beginning the day after surgery. 8.   SMOKING  Smoking is a great irritation to the surgical site. Smoking should be avoided or greatly reduced during the healing period. 9.   SUTURES  Sutures may have been used to close the surgical wound. They are the type that dissolve. They will begin to come out in about 5-7 days. Should you have any post-operative problems or questions, do not hesitate to call the office so we may help. Please call [phone]                 If you are having a medical emergency, call 911. DISCHARGE SUMMARY from Nurse    PATIENT INSTRUCTIONS:    After general anesthesia or intravenous sedation, for 24 hours or while taking prescription Narcotics:  · Limit your activities  · Do not drive and operate hazardous machinery  · Do not make important personal or business decisions  · Do  not drink alcoholic beverages  · If you have not urinated within 8 hours after discharge, please contact your surgeon on call. Report the following to your surgeon:  · Excessive pain, swelling, redness or odor of or around the surgical area  · Temperature over 100.5  · Nausea and vomiting lasting longer than 4 hours or if unable to take medications  · Any questions  · Follow Dr. Amauri Robertson written instructions  · Do not use straw to drink. Drink directly from cup or glass.         *  Please give a list of your current medications to your Primary Care Provider. *  Please update this list whenever your medications are discontinued, doses are      changed, or new medications (including over-the-counter products) are added. *  Please carry medication information at all times in case of emergency situations. These are general instructions for a healthy lifestyle:    No smoking/ No tobacco products/ Avoid exposure to second hand smoke  Surgeon General's Warning:  Quitting smoking now greatly reduces serious risk to your health. Obesity, smoking, and sedentary lifestyle greatly increases your risk for illness    A healthy diet, regular physical exercise & weight monitoring are important for maintaining a healthy lifestyle    You may be retaining fluid if you have a history of heart failure or if you experience any of the following symptoms:  Weight gain of 3 pounds or more overnight or 5 pounds in a week, increased swelling in our hands or feet or shortness of breath while lying flat in bed. Please call your doctor as soon as you notice any of these symptoms; do not wait until your next office visit. Patient armband removed and shredded    The discharge information has been reviewed with the patient. The patient verbalized understanding. Discharge medications reviewed with the patient and appropriate educational materials and side effects teaching were provided.   ___________________________________________________________________________________________________________________________________

## 2021-02-17 NOTE — BRIEF OP NOTE
Brief Postoperative Note    Patient: Stephenie Abdi  YOB: 1975  MRN: [id number]    Date of Procedure: 2/17/2021     Pre-Op Diagnosis: K02.9, Dental Caries    Post-Op Diagnosis: Same as preoperative diagnosis. Procedure(s):  Teeth Extraction 3,4,5,6,8,9,14, with upper right and upper left aveoloplasty    Surgeon(s):  Elvia Ruiz DDS    Surgical Assistant: Surg Asst-1: Ahsan Mahajan    Anesthesia: General     Estimated Blood Loss (mL): 20    Complications: None    Specimens: none    Implants: none    Drains: none    Findings: as expected from pre-operative diagnosis.     Electronically Signed by Eloisa Crawley DDS on 2/17/2021 at 11:07 AM

## 2021-02-17 NOTE — ANESTHESIA POSTPROCEDURE EVALUATION
Procedure(s):  Teeth Extraction 3,4,5,6,8,9,14, with upper right and upper left aveoloplasty.     general    Anesthesia Post Evaluation      Multimodal analgesia: multimodal analgesia used between 6 hours prior to anesthesia start to PACU discharge  Patient location during evaluation: PACU  Patient participation: complete - patient participated  Level of consciousness: awake and alert  Pain management: adequate  Airway patency: patent  Anesthetic complications: no  Respiratory status: acceptable  Hydration status: acceptable  Post anesthesia nausea and vomiting:  none  Final Post Anesthesia Temperature Assessment:  Normothermia (36.0-37.5 degrees C)      INITIAL Post-op Vital signs:   Vitals Value Taken Time   /80 02/17/21 1213   Temp 36.5 °C (97.7 °F) 02/17/21 1213   Pulse 79 02/17/21 1213   Resp 14 02/17/21 1213   SpO2 95 % 02/17/21 1213

## 2021-02-17 NOTE — PERIOP NOTES
Pre-Op Summary    Pt arrived via car with family/friend and is oriented to time, place, person and situation. Patient with steady gait with none assistive devices. Visit Vitals  /74 (BP 1 Location: Left upper arm, BP Patient Position: At rest)   Pulse 78   Temp 98.3 °F (36.8 °C)   Resp 18   Ht 5' 6\" (1.676 m)   Wt 104 kg (229 lb 6 oz)   LMP 01/28/2021 (Approximate)   SpO2 100%   BMI 37.02 kg/m²       Peripheral IV located on Right hand . Patients belongings are locked up on [facility]. Patient's point of contact is Neil Ramirez and their contact number is: [phone]. They will be leaving and coming back. They are able to receive medication information. They will be their ride home.

## 2021-02-18 NOTE — OP NOTES
[address] [address]  OPERATIVE REPORT    Name:  Sunny Philippe  MR#:   [id number]  :  1975  ACCOUNT #:  [de-identified]  DATE OF SERVICE:  2021    PREOPERATIVE DIAGNOSIS:  Caries. POSTOPERATIVE DIAGNOSIS:  Caries. PROCEDURE PERFORMED:  1. Surgical tooth extractions of teeth #3, 4, 5, 6, 8,9, and 14.  2.  Upper right alveoplasty. 3.  Upper left alveoplasty. SURGEON:  Gonzalez Cates DDS    ASSISTANT:  Gabby Sherwood    ANESTHESIA:  General via nasotracheal intubation. COMPLICATIONS:  None. SPECIMENS REMOVED:  None. IMPLANTS:  None. ESTIMATED BLOOD LOSS:  20 mL. FLUIDS:  500 mL crystalloid. DRAINS:  None. INDICATIONS:  This patient is a 61-year-old female who presented to [address] [place] [address] and Maxillofacial Surgery. She underwent previous extractions under IV sedation in our clinic, but due to the patient's high tolerance for anesthetic medications, it was deemed necessary to take her to the operating room to remove her remaining teeth. After all risks, benefits, and alternatives were discussed, the above procedure was reviewed in detail and the patient signed an informed consent. PROCEDURE:  The patient was correctly identified in the preoperative holding area and taken to operating room #5. She was transferred from the stretcher to the OR table under her own volition with the assistance from the [address] [place] [address] staff. She underwent induction and nasotracheal intubation by the anesthesia team.  There was noted to be some nasal bleeding, which stopped throughout the procedure, from the right naris. The eyes were taped by the anesthesia team and the nasotracheal tube was secured with my assistance. The arms were tucked and the patient was moved away from Anesthesia and prepped and draped in normal fashion for oral surgery. Attention was directed the oral cavity which was irrigated and suctioned dry. A throat pack was placed.   A total of 5 mL of 0.5% Marcaine in 1:200,000 epinephrine were injected. Attention was directed to right upper quadrant where a sulcular incision was made from the distal tooth #3 to the distal tooth #9 with bilateral distal buccal releases. A full mucoperiosteal flap was elevated across the arch. Buccal bone was removed from all teeth with a #702 bur. The teeth were elevated. Teeth #3, 4, 5, 6, 8,9 were then luxated and removed with a #150 forceps and an García forceps. The bone was then smoothed with a bone file bur and irrigated and suctioned dry. The extraction sites were curetted prior to irrigation. The site was closed with 3-0 chromic gut suture in continuous locking fashion. Attention was directed to the upper left quadrant where a sulcular incision was made from the distal edentulous site of tooth #15 to the mesial edentulous site of tooth #13 with bilateral vertical buccal releases. A #9 periosteal elevator was used to elevate a full mucoperiosteal flap buccal to the site. Buccal bone was removed from the tooth #14 with a #702 bur. The tooth was then luxated and removed with a #150 forceps. Extraction site was curetted. Alveoplasty was performed with an oval bur and followed by a rongeur and bone file. The site was then irrigated with copious amounts of normal saline and closed in interrupted fashion with 3-0 chromic gut suture. The oral cavity was then suctioned. The throat pack was removed. Bilateral packings were placed with umbilical tape around non-count, 4x4 gauze. The patient underwent emergence and extubation without complication.       VINCENT Solares/[id number]  D:  02/17/2021 11:12  T:  02/17/2021 19:11  JOB #:  [id number]

## (undated) DEVICE — INTENDED FOR TISSUE SEPARATION, AND OTHER PROCEDURES THAT REQUIRE A SHARP SURGICAL BLADE TO PUNCTURE OR CUT.: Brand: BARD-PARKER ® CARBON RIB-BACK BLADES

## (undated) DEVICE — NEEDLE HYPO 25GA L1.5IN BLU POLYPR HUB S STL REG BVL STR

## (undated) DEVICE — KENDALL SCD EXPRESS SLEEVES, KNEE LENGTH, MEDIUM: Brand: KENDALL SCD

## (undated) DEVICE — REM POLYHESIVE ADULT PATIENT RETURN ELECTRODE: Brand: VALLEYLAB

## (undated) DEVICE — MEDI-VAC SUCTION HANDLE REGULAR CAPACITY: Brand: CARDINAL HEALTH

## (undated) DEVICE — FIRM 4CM: Brand: NASOPORE

## (undated) DEVICE — ARGYLE FRAZIER SURGICAL SUCTION INSTRUMENT 12 FR/CH (4.0 MM): Brand: ARGYLE

## (undated) DEVICE — SPONGE GZ W4XL4IN COT 12 PLY TYP VII WVN C FLD DSGN

## (undated) DEVICE — SYR 10ML CTRL LR LCK NSAF LF --

## (undated) DEVICE — Z DISCONTINUED USE 2425483 (LOW STOCK PER MEDLINE) TAPE UMB L18IN DIA1/8IN WHT COT NONABSORBABLE W/O NDL FOR

## (undated) DEVICE — SYR 20ML LL STRL LF --

## (undated) DEVICE — STERILE POLYISOPRENE POWDER-FREE SURGICAL GLOVES: Brand: PROTEXIS

## (undated) DEVICE — SURGIFOAM SPNG SZ 12-7

## (undated) DEVICE — PACKING GZ W2INXL6FT WVN COT VAG RADPQ

## (undated) DEVICE — DEPAUL MINOR HEAD AND NECK: Brand: MEDLINE INDUSTRIES, INC.

## (undated) DEVICE — SUTURE CHROMIC GUT SZ 3-0 L27IN ABSRB BRN L17MM RB-1 1/2 U204H

## (undated) DEVICE — SOLUTION IV 1000ML 0.9% SOD CHL